# Patient Record
Sex: FEMALE | Race: WHITE | NOT HISPANIC OR LATINO | URBAN - METROPOLITAN AREA
[De-identification: names, ages, dates, MRNs, and addresses within clinical notes are randomized per-mention and may not be internally consistent; named-entity substitution may affect disease eponyms.]

---

## 2017-02-02 ENCOUNTER — OUTPATIENT (OUTPATIENT)
Dept: OUTPATIENT SERVICES | Facility: HOSPITAL | Age: 32
LOS: 1 days | Discharge: HOME | End: 2017-02-02

## 2017-06-27 DIAGNOSIS — R53.83 OTHER FATIGUE: ICD-10-CM

## 2017-06-27 DIAGNOSIS — F41.3 OTHER MIXED ANXIETY DISORDERS: ICD-10-CM

## 2017-09-19 ENCOUNTER — OUTPATIENT (OUTPATIENT)
Dept: OUTPATIENT SERVICES | Facility: HOSPITAL | Age: 32
LOS: 1 days | Discharge: HOME | End: 2017-09-19

## 2017-09-19 DIAGNOSIS — E28.2 POLYCYSTIC OVARIAN SYNDROME: ICD-10-CM

## 2017-09-19 DIAGNOSIS — G03.9 MENINGITIS, UNSPECIFIED: ICD-10-CM

## 2018-01-30 ENCOUNTER — OUTPATIENT (OUTPATIENT)
Dept: OUTPATIENT SERVICES | Facility: HOSPITAL | Age: 33
LOS: 1 days | Discharge: HOME | End: 2018-01-30

## 2018-01-30 DIAGNOSIS — R10.2 PELVIC AND PERINEAL PAIN: ICD-10-CM

## 2018-02-04 DIAGNOSIS — G03.9 MENINGITIS, UNSPECIFIED: ICD-10-CM

## 2018-05-31 ENCOUNTER — OUTPATIENT (OUTPATIENT)
Dept: OUTPATIENT SERVICES | Facility: HOSPITAL | Age: 33
LOS: 1 days | Discharge: HOME | End: 2018-05-31

## 2018-05-31 DIAGNOSIS — E06.3 AUTOIMMUNE THYROIDITIS: ICD-10-CM

## 2018-05-31 DIAGNOSIS — E28.2 POLYCYSTIC OVARIAN SYNDROME: ICD-10-CM

## 2018-05-31 DIAGNOSIS — D63.8 ANEMIA IN OTHER CHRONIC DISEASES CLASSIFIED ELSEWHERE: ICD-10-CM

## 2018-08-16 ENCOUNTER — OUTPATIENT (OUTPATIENT)
Dept: OUTPATIENT SERVICES | Facility: HOSPITAL | Age: 33
LOS: 1 days | Discharge: HOME | End: 2018-08-16

## 2018-08-16 DIAGNOSIS — R74.0 NONSPECIFIC ELEVATION OF LEVELS OF TRANSAMINASE AND LACTIC ACID DEHYDROGENASE [LDH]: ICD-10-CM

## 2018-08-16 DIAGNOSIS — E06.3 AUTOIMMUNE THYROIDITIS: ICD-10-CM

## 2018-08-16 DIAGNOSIS — Z01.419 ENCOUNTER FOR GYNECOLOGICAL EXAMINATION (GENERAL) (ROUTINE) WITHOUT ABNORMAL FINDINGS: ICD-10-CM

## 2019-01-03 ENCOUNTER — OUTPATIENT (OUTPATIENT)
Dept: OUTPATIENT SERVICES | Facility: HOSPITAL | Age: 34
LOS: 1 days | Discharge: HOME | End: 2019-01-03

## 2019-01-03 DIAGNOSIS — R51 HEADACHE: ICD-10-CM

## 2019-01-03 DIAGNOSIS — M54.2 CERVICALGIA: ICD-10-CM

## 2019-02-26 ENCOUNTER — OUTPATIENT (OUTPATIENT)
Dept: OUTPATIENT SERVICES | Facility: HOSPITAL | Age: 34
LOS: 1 days | Discharge: HOME | End: 2019-02-26

## 2019-02-26 DIAGNOSIS — R00.2 PALPITATIONS: ICD-10-CM

## 2019-02-26 DIAGNOSIS — Q79.6 EHLERS-DANLOS SYNDROMES: ICD-10-CM

## 2019-02-26 DIAGNOSIS — I95.1 ORTHOSTATIC HYPOTENSION: ICD-10-CM

## 2019-04-17 ENCOUNTER — TRANSCRIPTION ENCOUNTER (OUTPATIENT)
Age: 34
End: 2019-04-17

## 2019-04-28 ENCOUNTER — EMERGENCY (EMERGENCY)
Facility: HOSPITAL | Age: 34
LOS: 0 days | Discharge: HOME | End: 2019-04-28
Attending: EMERGENCY MEDICINE | Admitting: EMERGENCY MEDICINE
Payer: MEDICAID

## 2019-04-28 VITALS
WEIGHT: 214.95 LBS | SYSTOLIC BLOOD PRESSURE: 130 MMHG | OXYGEN SATURATION: 97 % | TEMPERATURE: 98 F | DIASTOLIC BLOOD PRESSURE: 71 MMHG | HEIGHT: 67 IN | HEART RATE: 98 BPM | RESPIRATION RATE: 20 BRPM

## 2019-04-28 VITALS
OXYGEN SATURATION: 99 % | DIASTOLIC BLOOD PRESSURE: 74 MMHG | SYSTOLIC BLOOD PRESSURE: 116 MMHG | RESPIRATION RATE: 18 BRPM | HEART RATE: 80 BPM

## 2019-04-28 DIAGNOSIS — T40.7X5A ADVERSE EFFECT OF CANNABIS (DERIVATIVES), INITIAL ENCOUNTER: ICD-10-CM

## 2019-04-28 DIAGNOSIS — Z79.899 OTHER LONG TERM (CURRENT) DRUG THERAPY: ICD-10-CM

## 2019-04-28 DIAGNOSIS — R20.0 ANESTHESIA OF SKIN: ICD-10-CM

## 2019-04-28 DIAGNOSIS — F41.9 ANXIETY DISORDER, UNSPECIFIED: ICD-10-CM

## 2019-04-28 DIAGNOSIS — R00.2 PALPITATIONS: ICD-10-CM

## 2019-04-28 PROCEDURE — 99284 EMERGENCY DEPT VISIT MOD MDM: CPT

## 2019-04-28 PROCEDURE — 71046 X-RAY EXAM CHEST 2 VIEWS: CPT | Mod: 26

## 2019-04-28 NOTE — ED ADULT NURSE NOTE - OBJECTIVE STATEMENT
pt states she accidentally ate some candy that contained marijuana. She c/o  muscle twitching, increased anxiety and shakiness

## 2019-04-28 NOTE — ED PROVIDER NOTE - CARE PROVIDER_API CALL
Rey Wiley)  Cardiovascular Disease; Internal Medicine  [address] [place] [id number]  Phone: [phone]  Fax: [phone]  Follow Up Time: 1-3 Days

## 2019-04-28 NOTE — ED PROVIDER NOTE - OBJECTIVE STATEMENT
34 y./o female w/ hx of anxiety presents to the ED for evaluation of anxiety.  States that she was feeling anxious this evening and ate marijuana edibles.  About 1.5 hrs later developed palpitations which concerned her prompting visit to the ED.  Feels improved at this time.  No further complaints.  Chinedu chest pain, dyspnea, abd pain, edema of lower extremities, calf pain, hemoptysis, cough, N/V/D.  Denies SI/HI.

## 2019-04-28 NOTE — ED PROVIDER NOTE - PHYSICAL EXAMINATION
CONST: Well appearing in NAD  EYES: Sclera and conjunctiva clear.  CARD: Normal S1 S2; Normal rate and rhythm  RESP: Equal BS B/L, No wheezes, rhonchi or rales. No distress  GI: Soft, non-tender, non-distended.  MS: Normal ROM in all extremities. No edema of lower extremities, no calf pain, radial pulses 2+ bilaterally  SKIN: Warm, dry, no acute rashes. Good turgor  NEURO: A&Ox3, No focal deficits. Strength 5/5 with no sensory deficits. Steady gait  PSYCH: denies SI/HI, cooperative.

## 2019-04-28 NOTE — ED PROVIDER NOTE - NSFOLLOWUPINSTRUCTIONS_ED_ALL_ED_FT
Anxiety    Generalized anxiety disorder (RENATO) is a mental disorder. It is defined as anxiety that is not necessarily related to specific events or activities or is out of proportion to said events. Symptoms include restlessness, fatigue, difficulty concentrations, irritability and difficulty concentrating. It may interfere with life functions, including relationships, work, and school. If you were started on a medication, make sure to take exactly as prescribed and follow up with a psychiatrist.    SEEK IMMEDIATE MEDICAL CARE IF YOU HAVE ANY OF THE FOLLOWING SYMPTOMS: thoughts about hurting killing yourself, thoughts about hurting or killing somebody else, hallucinations, or worsening depression.,    Palpitations    A palpitation is the feeling that your heartbeat is irregular or is faster than normal. It may feel like your heart is fluttering or skipping a beat. They may be caused by many things, including smoking, caffeine, alcohol, stress, and certain medicines. Although most causes of palpitations are not serious, palpitations can be a sign of a serious medical problem. Avoid caffeine, alcohol, and tobacco products at home. Try to reduce stress and anxiety and make sure to get plenty of rest.     SEEK IMMEDIATE MEDICAL CARE IF YOU HAVE ANY OF THE FOLLOWING SYMPTOMS: chest pain, shortness of breath, severe headache, dizziness/lightheadedness, or fainting.    Follow up with your primary medical doctor in 1-2 days

## 2019-04-28 NOTE — ED ADULT NURSE NOTE - CHIEF COMPLAINT QUOTE
Pt states she accidentally ingested some candy containing marijuana and is now shaking and twitching.  She is concerned due to her usual medications.

## 2019-04-28 NOTE — ED PROVIDER NOTE - ATTENDING CONTRIBUTION TO CARE
I personally evaluated the patient. I reviewed the Resident’s or Physician Assistant’s note (as assigned above), and agree with the findings and plan except as documented in my note.  Chart reviewed. H/O anxiety, took 2 tabs of marijuana pills, presents with palitations, anxiety and numbness to both hands. Exam unremarkable. Will order FS, EKG and CXR.

## 2019-04-28 NOTE — ED PROVIDER NOTE - NS ED ROS FT
Constitutional: See HPI.  Eyes: No visual changes, eye pain or discharge.   ENMT: No hearing changes, pain, discharge or infections.   Cardiac: + palpitations. No SOB or edema. No chest pain with exertion.  Respiratory: No cough or respiratory distress.   GI: No nausea, vomiting, diarrhea or abdominal pain.  : No dysuria, frequency or burning. No Discharge  MS: No myalgia, muscle weakness, joint pain or back pain.  Neuro: No headache or weakness.   Skin: No skin rash.  Except as documented in the HPI, all other systems are negative.

## 2019-05-13 ENCOUNTER — APPOINTMENT (OUTPATIENT)
Dept: NEUROLOGY | Facility: CLINIC | Age: 34
End: 2019-05-13

## 2019-05-31 ENCOUNTER — APPOINTMENT (OUTPATIENT)
Dept: OTOLARYNGOLOGY | Facility: CLINIC | Age: 34
End: 2019-05-31
Payer: MEDICAID

## 2019-05-31 VITALS
WEIGHT: 210 LBS | SYSTOLIC BLOOD PRESSURE: 101 MMHG | BODY MASS INDEX: 32.96 KG/M2 | DIASTOLIC BLOOD PRESSURE: 70 MMHG | HEIGHT: 67 IN

## 2019-05-31 DIAGNOSIS — H91.93 UNSPECIFIED HEARING LOSS, BILATERAL: ICD-10-CM

## 2019-05-31 PROCEDURE — 92550 TYMPANOMETRY & REFLEX THRESH: CPT

## 2019-05-31 PROCEDURE — 99204 OFFICE O/P NEW MOD 45 MIN: CPT | Mod: 25

## 2019-05-31 PROCEDURE — 92557 COMPREHENSIVE HEARING TEST: CPT

## 2019-05-31 NOTE — HISTORY OF PRESENT ILLNESS
[de-identified] : Patient here today c/o possible bilateral hearing loss. Patient admits it has been going on for about 6 months. \par Patient feels that she asks people to repeat themselves a lot. \par Patient admits tinnitus in b/l ears. Shooting pain from time to time. No otorrhea. \par \par SHe says her symptoms are getting better slowly, but still not normal.\par

## 2019-05-31 NOTE — PHYSICAL EXAM
[de-identified] : pain upon palpation of left TMJ.  [Midline] : trachea located in midline position [Normal] : no rashes

## 2019-08-06 ENCOUNTER — APPOINTMENT (OUTPATIENT)
Dept: NEUROLOGY | Facility: CLINIC | Age: 34
End: 2019-08-06
Payer: MEDICAID

## 2019-08-06 VITALS
SYSTOLIC BLOOD PRESSURE: 122 MMHG | DIASTOLIC BLOOD PRESSURE: 81 MMHG | HEART RATE: 75 BPM | HEIGHT: 67 IN | WEIGHT: 210 LBS | BODY MASS INDEX: 32.96 KG/M2 | OXYGEN SATURATION: 97 %

## 2019-08-06 PROBLEM — F41.9 ANXIETY DISORDER, UNSPECIFIED: Chronic | Status: ACTIVE | Noted: 2019-04-28

## 2019-08-06 PROCEDURE — 99214 OFFICE O/P EST MOD 30 MIN: CPT

## 2019-08-06 NOTE — PHYSICAL EXAM
[FreeTextEntry1] : Orientation: oriented to person, oriented to place and oriented to time. \par Attention: normal concentrating ability and visual attention was not decreased. \par Language: no difficulty naming common objects, no difficulty repeating a phrase, no difficulty writing a sentence, fluency intact, comprehension intact and reading intact. \par Fund of knowledge: displays adequate knowledge of personal past history. \par Cranial Nerves: visual fields full to confrontation, pupils equal round and reactive to light, extraocular motion intact, facial sensation intact symmetrically, face symmetrical, hearing was intact bilaterally, tongue and palate midline, head turning and shoulder shrug symmetric and there was no tongue deviation with protrusion. \par Motor: muscle tone was normal in all four extremities, muscle strength was normal in all four extremities and normal bulk in all four extremities. \par Sensory exam: light touch, PP, Vibration was intact. \par Coordination:. normal gait. balance was intact. there was no past-pointing. no tremor present. \par Deep tendon reflexes: \par Biceps right 2+. Biceps left 2+.  \par Triceps right 2+. Triceps left 2+.  LOC\par Brachioradialis right 2+. Brachioradialis left 2+.  \par Patella right 2+. Patella left 2+.  \par Ankle jerk right 2+. Ankle jerk left 2+. \par Plantar responses normal on the right, normal on the left.  \par

## 2019-08-06 NOTE — DISCUSSION/SUMMARY
[FreeTextEntry1] : Ms. Deshpande is a 33yo woman with cervical spinal stenosis with spasms in different muscle.  No longer having neck pain and still gets headaches but significantly less.\par 1. Physical therapy\par 2. f/u in 6 months

## 2019-10-22 ENCOUNTER — OUTPATIENT (OUTPATIENT)
Dept: OUTPATIENT SERVICES | Facility: HOSPITAL | Age: 34
LOS: 1 days | Discharge: HOME | End: 2019-10-22

## 2019-10-22 DIAGNOSIS — Z00.00 ENCOUNTER FOR GENERAL ADULT MEDICAL EXAMINATION WITHOUT ABNORMAL FINDINGS: ICD-10-CM

## 2019-10-22 DIAGNOSIS — E03.9 HYPOTHYROIDISM, UNSPECIFIED: ICD-10-CM

## 2019-10-22 DIAGNOSIS — N39.0 URINARY TRACT INFECTION, SITE NOT SPECIFIED: ICD-10-CM

## 2019-10-22 DIAGNOSIS — D53.9 NUTRITIONAL ANEMIA, UNSPECIFIED: ICD-10-CM

## 2019-10-22 DIAGNOSIS — E55.9 VITAMIN D DEFICIENCY, UNSPECIFIED: ICD-10-CM

## 2019-10-22 DIAGNOSIS — E78.5 HYPERLIPIDEMIA, UNSPECIFIED: ICD-10-CM

## 2019-10-22 DIAGNOSIS — D51.8 OTHER VITAMIN B12 DEFICIENCY ANEMIAS: ICD-10-CM

## 2019-10-22 DIAGNOSIS — E11.9 TYPE 2 DIABETES MELLITUS WITHOUT COMPLICATIONS: ICD-10-CM

## 2020-02-05 ENCOUNTER — APPOINTMENT (OUTPATIENT)
Dept: NEUROLOGY | Facility: CLINIC | Age: 35
End: 2020-02-05
Payer: MEDICAID

## 2020-02-05 VITALS
WEIGHT: 210 LBS | SYSTOLIC BLOOD PRESSURE: 109 MMHG | BODY MASS INDEX: 32.96 KG/M2 | HEART RATE: 70 BPM | HEIGHT: 67 IN | DIASTOLIC BLOOD PRESSURE: 76 MMHG

## 2020-02-05 DIAGNOSIS — M25.519 PAIN IN UNSPECIFIED SHOULDER: ICD-10-CM

## 2020-02-05 PROCEDURE — 99214 OFFICE O/P EST MOD 30 MIN: CPT

## 2020-02-05 NOTE — REVIEW OF SYSTEMS
[As Noted in HPI] : as noted in HPI [Arthralgias] : arthralgias [Joint Pain] : joint pain [Negative] : ENT

## 2020-02-05 NOTE — PHYSICAL EXAM
[FreeTextEntry1] : Orientation: oriented to person, oriented to place and oriented to time. \par Attention: normal concentrating ability and visual attention was not decreased. \par Language: no difficulty naming common objects, no difficulty repeating a phrase, no difficulty writing a sentence, fluency intact, comprehension intact and reading intact. \par Fund of knowledge: displays adequate knowledge of personal past history. \par Cranial Nerves: visual fields full to confrontation, pupils equal round and reactive to light, extraocular motion intact, facial sensation intact symmetrically, face symmetrical, hearing was intact bilaterally, tongue and palate midline, head turning and shoulder shrug symmetric and there was no tongue deviation with protrusion. \par Motor: muscle tone was normal in all four extremities, muscle strength was normal in all four extremities and normal bulk in all four extremities. \par Sensory exam: light touch, PP, Vibration was intact. \par Coordination:. normal gait. balance was intact. there was no past-pointing. no tremor present. \par Deep tendon reflexes: \par Biceps right 2+. Biceps left 2+. \par Triceps right 2+. Triceps left 2+. LOC\par Brachioradialis right 2+. Brachioradialis left 2+. \par Patella right 2+. Patella left 2+. \par Ankle jerk right 2+. Ankle jerk left 2+. \par Plantar responses normal on the right, normal on the left.\par \par Of note there is pain in subscapular region on right and paraspinal muscle on the right going further down he thoracic spine region

## 2020-02-05 NOTE — DISCUSSION/SUMMARY
[FreeTextEntry1] : Ms. Deshpande is a 35yo woman with migraines, cervical spinal stenosis and new throacic and subscapular pain.\par 1. MRI thoracic spine and xray throacic spine\par 2. PT for neck\par 3. Follow with orthopedics\par 4. Neurosurgery referral for cervical spinal stenosis\par 5. Robaxin 750mg PRN\par 6. Aimovig for migraines which are 12-14 per month and failed topamax and nortriptiline in past and was using imitrex in past\par 7. f/u in 3 months

## 2020-02-05 NOTE — HISTORY OF PRESENT ILLNESS
[FreeTextEntry1] : Ms. Deshpande is a 34-year-old woman last seen by me on 8/6/2019 for her headaches and migraines. She was sent for an mri cervical spine which showed moderate to severe spinal stenosis at c5/c6. She is getting neck pain still but now has pain in her right subscapular region which has been severe for the last few weeks.  She doesn’t remember doing anything that could have caused this and has an appt with orthopedics later this week as well.  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

## 2020-02-25 ENCOUNTER — APPOINTMENT (OUTPATIENT)
Dept: NEUROSURGERY | Facility: CLINIC | Age: 35
End: 2020-02-25
Payer: MEDICAID

## 2020-02-25 VITALS — WEIGHT: 210 LBS | BODY MASS INDEX: 32.96 KG/M2 | HEIGHT: 67 IN

## 2020-02-25 DIAGNOSIS — Z82.61 FAMILY HISTORY OF ARTHRITIS: ICD-10-CM

## 2020-02-25 DIAGNOSIS — Z82.0 FAMILY HISTORY OF EPILEPSY AND OTHER DISEASES OF THE NERVOUS SYSTEM: ICD-10-CM

## 2020-02-25 DIAGNOSIS — Z82.62 FAMILY HISTORY OF OSTEOPOROSIS: ICD-10-CM

## 2020-02-25 DIAGNOSIS — Z82.49 FAMILY HISTORY OF ISCHEMIC HEART DISEASE AND OTHER DISEASES OF THE CIRCULATORY SYSTEM: ICD-10-CM

## 2020-02-25 PROCEDURE — 99203 OFFICE O/P NEW LOW 30 MIN: CPT

## 2020-02-25 NOTE — REASON FOR VISIT
[Referred By: _________] : Patient was referred by GUERLINE [New Patient Visit] : a new patient visit

## 2020-02-26 NOTE — HISTORY OF PRESENT ILLNESS
[de-identified] : This is a 34 yrs old female who presents today for a consultation of neck stiffness and at times right arm pain. Patient reports she was experiencing right subscapular pain a few weeks ago which is now resolve after taking robaxin. She went to the orthopedic surgeon to be evaluated for this particular pain and found to have a right shoulder labral tear, though her subscapular pain she was told was coming from her neck. At times she experiences pins and needles in her hands and feet, and muscle spasms in her extremities, causing her to have jerking movement. Denies neck pain, gait disturbances, imbalance, bowel and/or urinary incontinence. Dr. Hernandez ordered an MRI of the cervical spine and referred her to neurosurgery. \par \par MRI of the cervical spine without contrast done on 1/30/2019 showed C5-6 disc herniation with moderate to severe spinal stenosis [FreeTextEntry1] : neck stiffness

## 2020-02-26 NOTE — PLAN
[FreeTextEntry1] : At this time, surgery is not indicated.  She has not completed conservative management.  Should her symptoms persist, I recommend a new MRI cervical spine.  If there are no changes, she may be a candidate for a C5-6 TDR.

## 2020-03-04 ENCOUNTER — APPOINTMENT (OUTPATIENT)
Dept: CARDIOLOGY | Facility: CLINIC | Age: 35
End: 2020-03-04

## 2020-03-30 ENCOUNTER — APPOINTMENT (OUTPATIENT)
Dept: CARDIOLOGY | Facility: CLINIC | Age: 35
End: 2020-03-30

## 2020-04-06 ENCOUNTER — APPOINTMENT (OUTPATIENT)
Dept: CARDIOLOGY | Facility: CLINIC | Age: 35
End: 2020-04-06

## 2020-04-13 ENCOUNTER — APPOINTMENT (OUTPATIENT)
Dept: CARDIOLOGY | Facility: CLINIC | Age: 35
End: 2020-04-13

## 2020-05-11 ENCOUNTER — APPOINTMENT (OUTPATIENT)
Dept: NEUROLOGY | Facility: CLINIC | Age: 35
End: 2020-05-11
Payer: MEDICAID

## 2020-05-11 PROCEDURE — 99214 OFFICE O/P EST MOD 30 MIN: CPT | Mod: 95

## 2020-05-11 NOTE — PHYSICAL EXAM
[FreeTextEntry1] : a+ox3 language and attention normal\par No facial EOMI\par No drift \par ANABEL symemtric\par

## 2020-05-11 NOTE — DISCUSSION/SUMMARY
[FreeTextEntry1] : Ms. Deshpande is a 36yo woman with migraines, cervical radiculopathy and now new lumbar radiculopathy\par 1. MRI cervical and lumbar spine w/o RENATO\par 2. Increase Aimovig to 140\par 3. Indomethacin 50mg aty onset of lacrimation before migraines\par 4. Riboflavin 100mg BID\par 5. PT for the neck\par 6. f/u in 3-4 weeks post imaging

## 2020-05-11 NOTE — HISTORY OF PRESENT ILLNESS
[FreeTextEntry1] : Ms. Deshpande is a 34yo woman last seen by me on 2/5/2020 for f/u of her migraines and cervical radiculopathy.  since the last visit she saw Dr. Jones of neurosx and was not a candidate for sx at that time.  While working a new job she injured her lower back and after taking 800mg of motrin and 1000mg of robaxin for about a week it improved.  She still gets pain in the back when she bends the wrong way.  She has also been getting more migraines and the aimovig 70mg has not done muchto reduce the migrain frequency

## 2020-05-28 ENCOUNTER — OUTPATIENT (OUTPATIENT)
Dept: OUTPATIENT SERVICES | Facility: HOSPITAL | Age: 35
LOS: 1 days | Discharge: HOME | End: 2020-05-28
Payer: MEDICAID

## 2020-05-28 DIAGNOSIS — M54.5 LOW BACK PAIN: ICD-10-CM

## 2020-05-28 DIAGNOSIS — M54.16 RADICULOPATHY, LUMBAR REGION: ICD-10-CM

## 2020-05-28 PROCEDURE — 72110 X-RAY EXAM L-2 SPINE 4/>VWS: CPT | Mod: 26

## 2020-07-22 ENCOUNTER — APPOINTMENT (OUTPATIENT)
Dept: NEUROLOGY | Facility: CLINIC | Age: 35
End: 2020-07-22
Payer: MEDICAID

## 2020-07-22 VITALS
SYSTOLIC BLOOD PRESSURE: 104 MMHG | BODY MASS INDEX: 34.53 KG/M2 | HEART RATE: 94 BPM | WEIGHT: 220 LBS | TEMPERATURE: 97.3 F | OXYGEN SATURATION: 98 % | HEIGHT: 67 IN | DIASTOLIC BLOOD PRESSURE: 64 MMHG

## 2020-07-22 DIAGNOSIS — G47.00 INSOMNIA, UNSPECIFIED: ICD-10-CM

## 2020-07-22 PROCEDURE — 99214 OFFICE O/P EST MOD 30 MIN: CPT

## 2020-07-22 NOTE — HISTORY OF PRESENT ILLNESS
[FreeTextEntry1] : Ms. Deshpande is a 34yo woman last seen by me on 5/11/2020 for follow up of her migraines and cervical and lumbar radiculopathy.  At last visit she was complaining of both her neck and back bothering her more and the lower back was the most severe.  I sent her for an xray of the LS spine which showed mild degenerative changes and she was started on NSAID and muscle relaxers.  She did get some improvement but still deals with daily pain in the back radiating down the right buttocks and leg.  Intermittently her right leg will give out.  She continues to get neck pain and spasms and her last MRI showed moderate to severe spinal canal stenosis at c5/c6 and she was being watched for worsening.  Since then she has had a few falls which have aggravated her neck but also has been noticing that she is becoming off balance frequently.

## 2020-07-22 NOTE — REVIEW OF SYSTEMS
[As Noted in HPI] : as noted in HPI [Arthralgias] : arthralgias [Joint Pain] : joint pain [Negative] : Eyes

## 2020-07-22 NOTE — DISCUSSION/SUMMARY
[FreeTextEntry1] : Ms. Deshpande is a 36yo woman with worsening neck and lower back pain with progression of symptoms clinically although she has been on NSAIDs and muscle relaxers with physical therapy.  Given the severity of her cervical spine with a number of falls recently would repeat the MRI cervical spine w/o RENATO to assess for progression of spinal stenosis and encroachment on the spinal cord.  aS for the lower back she has a clear L4/L5 demratomal sensory dysfunction and some weakness not present on prior visits most likely given her subjective description a new lumbar radiculopathy on the right predominantly.\par 1. MRI cervical and lumbar spine w/o RENATO\par 2. Continue Robaxin and naproxen\par 3. Continue PT\par 4. pain management referral\par 5. f/u in 3-4 months

## 2020-07-22 NOTE — PHYSICAL EXAM
[FreeTextEntry1] : a+Ox3 language and attention normal\par CN 2-12 normal\par power in UE 5/5 except left finger extension on right 5-/5\par power in LE 5/5 except right HF 4+/5 and right KF 5-/5\par Sensory decreased to PP, Temp in right L4/L5 demratomes\par DTR 2+ LUE and 1+ right bicep\par absent ankles and 1+ patellar reflexes\par FTN NL\par Rhomberg absent\par Gait antlagic \par

## 2020-07-30 ENCOUNTER — OUTPATIENT (OUTPATIENT)
Dept: OUTPATIENT SERVICES | Facility: HOSPITAL | Age: 35
LOS: 1 days | Discharge: HOME | End: 2020-07-30
Payer: MEDICAID

## 2020-07-30 DIAGNOSIS — M48.9 SPONDYLOPATHY, UNSPECIFIED: ICD-10-CM

## 2020-07-30 DIAGNOSIS — M54.16 RADICULOPATHY, LUMBAR REGION: ICD-10-CM

## 2020-07-30 PROCEDURE — 72148 MRI LUMBAR SPINE W/O DYE: CPT | Mod: 26

## 2020-07-30 PROCEDURE — 72052 X-RAY EXAM NECK SPINE 6/>VWS: CPT | Mod: 26

## 2020-07-30 PROCEDURE — 72141 MRI NECK SPINE W/O DYE: CPT | Mod: 26

## 2020-09-02 ENCOUNTER — RESULT REVIEW (OUTPATIENT)
Age: 35
End: 2020-09-02

## 2020-10-06 NOTE — ED ADULT TRIAGE NOTE - CHIEF COMPLAINT QUOTE
Pt states she accidentally ingested some candy containing marijuana and is now shaking and twitching.  She is concerned due to her usual medications. yes

## 2020-11-17 ENCOUNTER — RX RENEWAL (OUTPATIENT)
Age: 35
End: 2020-11-17

## 2020-11-23 ENCOUNTER — APPOINTMENT (OUTPATIENT)
Dept: NEUROLOGY | Facility: CLINIC | Age: 35
End: 2020-11-23
Payer: COMMERCIAL

## 2020-11-23 PROCEDURE — 99214 OFFICE O/P EST MOD 30 MIN: CPT | Mod: 95

## 2020-11-30 NOTE — PHYSICAL EXAM
[FreeTextEntry1] : gait is antalgic with a limp\par decreased sensation in the left l4 dermatome to LT\par

## 2020-11-30 NOTE — REVIEW OF SYSTEMS
[Feeling Poorly] : feeling poorly [As Noted in HPI] : as noted in HPI [Depression] : depression [Arthralgias] : arthralgias [Joint Pain] : joint pain [Joint Stiffness] : joint stiffness [Limb Pain] : limb pain [Negative] : ENT

## 2020-11-30 NOTE — HISTORY OF PRESENT ILLNESS
[FreeTextEntry1] : Ms. Deshpande is a 36yo woman being followed for her cervical and lumbar spine disease.  Her lower back pain was improving and was radiating down her right leg in the past but much better over last 2-3 months. She then fell recently and felt that she popped something in her back and since this time 2-3 weeks ago she has been having severe pain in her lower back radiating down the left leg.  This is different from her prior pains radiating down her right leg.  This is hindered her walking and PT appears to exacerbate her back pain.  NO change in her bowel or bladder habits\par

## 2020-11-30 NOTE — DISCUSSION/SUMMARY
[FreeTextEntry1] : Ms. Deshpande is a 34yo woman with migraines, cervical and lumbar radiculopathy with recent fall with new constant left radicular pain.\par 1. Medrol dose tiago\par 2. MRI lumbar spine w/o RENATO\par 3. PT for lumbar spine\par

## 2020-12-19 ENCOUNTER — OUTPATIENT (OUTPATIENT)
Dept: OUTPATIENT SERVICES | Facility: HOSPITAL | Age: 35
LOS: 1 days | Discharge: HOME | End: 2020-12-19
Payer: COMMERCIAL

## 2020-12-19 DIAGNOSIS — M54.16 RADICULOPATHY, LUMBAR REGION: ICD-10-CM

## 2020-12-19 PROCEDURE — 72110 X-RAY EXAM L-2 SPINE 4/>VWS: CPT | Mod: 26

## 2021-01-06 ENCOUNTER — APPOINTMENT (OUTPATIENT)
Dept: NEUROLOGY | Facility: CLINIC | Age: 36
End: 2021-01-06
Payer: COMMERCIAL

## 2021-01-06 PROCEDURE — 99213 OFFICE O/P EST LOW 20 MIN: CPT | Mod: 95

## 2021-01-06 NOTE — REASON FOR VISIT
[Home] : at home, [unfilled] , at the time of the visit. [Medical Office: ([facility])___] : at the medical office located in  [Verbal consent obtained from patient] : the patient, [unfilled]

## 2021-01-07 NOTE — DISCUSSION/SUMMARY
[FreeTextEntry1] : Ms. Deshpande is a 34yo woman being seen for follow up of her lower back pain.  She has been using OTC anti-inflammatory medications with some slight imrpovement in back pain but still notices significant pain mostly on the right lower back radiating down her leg.  She has been trying exercises she did with PT in the past but does not feel any improvement.  At this point will send her back for PT again to see if she can make any further improvement.\par 1. PT lumbar spine\par 2. Neurosurgery referral Dr. Jones - f/u chronic lumbar radiculopathy\par 3. Adderall and Diazepam given as refill but recommended finding a doctor to maintain these\par 4. Ibuprofen or alleve PRN pain\par 5. f/u in 2 months

## 2021-01-07 NOTE — PHYSICAL EXAM
[FreeTextEntry1] : limited exam\par A+Ox3\par No facial EOMI\par Movements symmetric\par Slight decrease in foot tap speed on right\par gait antalgic and stooped when getting up

## 2021-01-07 NOTE — HISTORY OF PRESENT ILLNESS
[FreeTextEntry1] : Ms. Deshpande is a 34yo woman being followed for her neck and lower back issues.  Since her fall in November she has been noticing worsening back pain and recently it has gotten slightly better with OTC NSAIDs and home exercise.  She still has pain and has difficulty leaning forward and carrying things.  She sometimes wakes in morning with pain in lower back.  While she has made some improvement

## 2021-02-06 ENCOUNTER — EMERGENCY (EMERGENCY)
Facility: HOSPITAL | Age: 36
LOS: 0 days | Discharge: HOME | End: 2021-02-06
Attending: EMERGENCY MEDICINE | Admitting: EMERGENCY MEDICINE
Payer: COMMERCIAL

## 2021-02-06 VITALS
HEIGHT: 67 IN | TEMPERATURE: 98 F | HEART RATE: 78 BPM | OXYGEN SATURATION: 99 % | RESPIRATION RATE: 18 BRPM | WEIGHT: 210.1 LBS | SYSTOLIC BLOOD PRESSURE: 136 MMHG | DIASTOLIC BLOOD PRESSURE: 78 MMHG

## 2021-02-06 DIAGNOSIS — Z00.00 ENCOUNTER FOR GENERAL ADULT MEDICAL EXAMINATION WITHOUT ABNORMAL FINDINGS: ICD-10-CM

## 2021-02-06 DIAGNOSIS — U07.1 COVID-19: ICD-10-CM

## 2021-02-06 DIAGNOSIS — R42 DIZZINESS AND GIDDINESS: ICD-10-CM

## 2021-02-06 PROCEDURE — 99284 EMERGENCY DEPT VISIT MOD MDM: CPT

## 2021-02-06 NOTE — ED ADULT NURSE NOTE - SUICIDE SCREENING QUESTION 3
pt eloped, states he wants his IVL removed and wants to leave, attempt to educate pt. IVL removed by PMD, pt refused to sign AMA papers.
No

## 2021-02-06 NOTE — ED PROVIDER NOTE - NSFOLLOWUPINSTRUCTIONS_ED_ALL_ED_FT
Novel Coronavirus (COVID-19)  The Facts  What is a coronavirus?  Coronaviruses are a large family of viruses that cause illnesses ranging from the common cold  to more severe diseases such as Middle East Respiratory Syndrome (MERS) and Severe Acute  Respiratory Syndrome (SARS).  What is Novel Coronavirus (COVID-19)?  COVID-19 is a new strain of Coronavirus that has not been previously identified in humans. COVID-19  was identified in [place], [place], [place] in December 2019 (COVID-19). COVID-19 has  since been identified outside of [place], in a growing number of countries internationally, including  the [place].  Where can I find the most recent information about COVID-19?  The Centers for Disease Control and Prevention (CDC) is closely monitoring the outbreak caused by the  COVID-19. For the latest information about COVID- 19, visit the CDC website at  https://www.cdc.gov/coronavirus/index.html  How are coronaviruses spread?  Coronaviruses can be transmitted from person-to- person, usually after close contact with an infected person,  for example, in a household, workplace, or healthcare setting via droplets that become airborne after a cough  or sneeze by an affected person. These droplets can then infect a nearby person. It is likely transmission also  occurs by touching recently contaminated surfaces.  What are the symptoms of coronavirus infection?  It depends on the virus, but common signs include fever and/or respiratory symptoms such as  cough and shortness of breath. In more severe cases, infection can cause pneumonia, severe acute  respiratory syndrome, kidney failure and even death. Fortunately, most cases of COVID-19 have an  illness no different than the influenza “flu”. With a majority of these patients having mild symptoms  and overall mortality which appears to be not much different than the flu.  Is there a treatment for a COVID-19?  There is no specific treatment for disease caused by COVID-19. However, many of the symptoms can  be treated based on the patient’s clinical condition. Supportive care for infected persons can be highly  effective.  What can I do to protect myself?  Washing your hands, covering your cough, and disinfecting surfaces are the best precautionary  measures. It is also advisable to avoid close contact with anyone showing symptoms of respiratory  illness such as coughing and sneezing. Those with symptoms should wear a surgical mask when  around others.  What can I do to protect those around me?  If you have been identified as someone who may be infected with COVID-19, we recommend you  follow the self-isolation procedures outlined below to protect those around you and limit the spread  of this virus.   March 3, 2020  Recommendations for Patients Advised to Self-Isolate  for Possible COVID-19 Exposure  We recommend the below precautionary steps from now until 14 days from when you  returned from your travel or date of your last known possible contact:  - Do not go to work, school, or public areas. Avoid using public transportation, ride-sharing, or  taxis.  - As much as possible, separate yourself from other people in your home. If you can, you should  stay in a room and away from other people in your home. Also, you should use a separate  bathroom, if available.  - Wear the supplied mask whenever you are around other people.  - If you have a non-urgent medical appointment, please reschedule for a later date. If the  appointment is urgent, please call the healthcare provider and tell them that you are on selfisolation for possible COVID-19. This will help the healthcare provider’s office take steps to keep  other people from getting infected or exposed. If you can reschedule routine appointments, do  so.  - Wash your hands often with soap and water for at least 15 to 20 seconds or clean your hands  with an alcohol-based hand  that contains 60 to 95% alcohol, covering all surfaces of  your hands and rubbing them together until they feel dry. Soap and water should be used  preferentially if hands are visibly dirty.  - Cover your mouth and nose with a tissue when you cough or sneeze. Throw used tissues in a  lined trash can; immediately wash your hands.  - Avoid touching your eyes, nose, and mouth with your hands.  - Avoid sharing personal household items. You should not share dishes, drinking glasses, cups,  eating utensils, towels, or bedding with other people or pets in your home. After using these  items, they should be washed thoroughly with soap and water.  - Clean and disinfect all “high-touch” surfaces every day. High touch surfaces include counters,  tabletops, doorknobs, light switches, remote controls, bathroom fixtures, toilets, phones,  keyboards, tablets, and bedside tables. Also, clean any surfaces that may have blood, stool, or  body fluids on them.       If you develop worsening symptoms:  - If you develop worsening symptoms, such as severe shortness of breath, please call [phone] option #[phone]. They will assist you in determining your next steps.  During your time on self-isolation do the following:  - Work from home if you are able to so.  - Limit social isolation by talking with friends and family on the phone or with face-time  - Talk with friends and relatives who don’t live with you about supporting each other if one  household has to be quarantined. For example, agree to drop groceries or other supplies at the  front door.  - Exercise and spend time outdoors away from others if able to do so.    Why didn’t I get tested for novel coronavirus (COVID-19)?  The number of available tests is very limited so strict rules exist for who is allowed to be tested.  [facility] has been authorized to perform testing and is currently working hard to be  able to start providing the test. Such testing is currently reserved for patients who have had  contact with someone infected with the virus, or those who are very sick a plus those who have  traveled to areas identified by the Centers for Disease Control and Prevention (CD) and will  require hospitalization.  What should I do now?  If you are well enough to be discharged home and are not in a high risk group to have  contracted the COVID-10, you should care for yourself at home exactly like you would if you  have Influenza “flu”. Follow all the standard guidelines about washing your hands, covering  your cough, etc.  You should return to the Emergency Department if you develop worse symptoms, trouble  breathing, chest pain, and/or a fever that doesn’t improve with over the counter  acetaminophen or ibuprofen.

## 2021-02-06 NOTE — ED PROVIDER NOTE - CLINICAL SUMMARY MEDICAL DECISION MAKING FREE TEXT BOX
Patient presented with lightheadedness with mild dyspnea, nausea and 1 episode of diarrhea s/p COVID dx. Otherwise afebrile, HD stable well appearing in ED, abd completely non-tender, lungs clear, 100% on RA. Obtained EKG which was non-ischemic. Patient ambulatory in ED without desaturation, tolerates PO. Given the above, will discharge home to quarantine. Patient agreeable with plan. Agrees to return to ED for any new or worsening symptoms.

## 2021-02-06 NOTE — ED PROVIDER NOTE - PATIENT PORTAL LINK FT
You can access the FollowMyHealth Patient Portal offered by [facility] by registering at the following website: [email or web address]. By joining [employer]’s FollowMyHealth portal, you will also be able to view your health information using other applications (apps) compatible with our system.

## 2021-02-06 NOTE — ED ADULT TRIAGE NOTE - CHIEF COMPLAINT QUOTE
pt BIBA from urgent care, testes positive with rapid test today, c/o diarrhea, nausea and near syncope.

## 2021-02-06 NOTE — ED PROVIDER NOTE - OBJECTIVE STATEMENT
34 yo female, pmh of hilda, presents to ed for lightheadedness, was at [facility], rapid + positive, went to [facility] for lightheadedness, one episode of diarrhea and nausea. denies fever, chills, cp, sob, syncope, le swelling, vomiting. no specific pain or radiation.

## 2021-02-06 NOTE — ED PROVIDER NOTE - NS ED ROS FT
Constitutional: (-) fever, (-) chills  Eyes: (-) visual changes  ENT: (-) nasal congestions  Cardiovascular: (-) chest pain, (-) syncope  Respiratory: (-) cough, (-) shortness of breath, (-) dyspnea,   Gastrointestinal: (-) vomiting, (+)diarrhea, (+)nausea,  Musculoskeletal: (-) neck pain, (-) back pain, (-) joint pain,  Integumentary: (-) rash, (-) edema, (-) bruises  Neurological: (-) headache, (-) loc, (-) dizziness, (-) tingling, (-)numbness, (+) lightheadedness   Peripheral Vascular: (-) leg swelling  :  (-)dysuria,  (-) hematuria  Allergic/Immunologic: (-) pruritus

## 2021-02-06 NOTE — ED PROVIDER NOTE - ATTENDING CONTRIBUTION TO CARE
35 year old female, pmhx as documented, presenting with lightheadedness s/p (+) COVID dx. States she also had 1 episode of diarrhea and nausea with mild dyspnea. Otherwise denies fevers, chest pain, palpitations, N/V or any other complaints.    Vital Signs: I have reviewed the initial vital signs.  Constitutional: NAD, well-nourished, appears stated age, no acute distress.  HEENT: Airway patent, moist MM, no erythema/swelling/deformity of oral structures. EOMI, PERRLA.  CV: regular rate, regular rhythm, well-perfused extremities, 2+ b/l DP and radial pulses equal.  Lungs: BCTA, no increased WOB.  ABD: NTND, no guarding or rebound, no pulsatile mass, no hernias.   MSK: Neck supple, nontender, nl ROM, no stepoff. Chest nontender. Back nontender in TLS spine or to b/l bony structures or flanks. Ext nontender, nl rom, no deformity.   INTEG: Skin warm, dry, no rash.  NEURO: A&Ox3, normal strength, nl sensation throughout, normal speech.   PSYCH: Calm, cooperative, normal affect and interaction.    Well appearing, normal O2 saturation. EKG reviewed and non-ischemic. Patient able to ambulate without dyspnea or desaturation, lungs clear, abd completely non-tender, tolerates PO. Given the above, will discharge. Patient agreeable with plan.

## 2021-02-09 ENCOUNTER — EMERGENCY (EMERGENCY)
Facility: HOSPITAL | Age: 36
LOS: 0 days | Discharge: HOME | End: 2021-02-09
Attending: STUDENT IN AN ORGANIZED HEALTH CARE EDUCATION/TRAINING PROGRAM | Admitting: STUDENT IN AN ORGANIZED HEALTH CARE EDUCATION/TRAINING PROGRAM
Payer: COMMERCIAL

## 2021-02-09 VITALS
SYSTOLIC BLOOD PRESSURE: 127 MMHG | WEIGHT: 210.1 LBS | TEMPERATURE: 98 F | OXYGEN SATURATION: 99 % | HEIGHT: 67 IN | DIASTOLIC BLOOD PRESSURE: 63 MMHG | RESPIRATION RATE: 22 BRPM | HEART RATE: 80 BPM

## 2021-02-09 DIAGNOSIS — R06.02 SHORTNESS OF BREATH: ICD-10-CM

## 2021-02-09 DIAGNOSIS — Z79.899 OTHER LONG TERM (CURRENT) DRUG THERAPY: ICD-10-CM

## 2021-02-09 DIAGNOSIS — U07.1 COVID-19: ICD-10-CM

## 2021-02-09 DIAGNOSIS — J45.909 UNSPECIFIED ASTHMA, UNCOMPLICATED: ICD-10-CM

## 2021-02-09 LAB
ALBUMIN SERPL ELPH-MCNC: 4 G/DL — SIGNIFICANT CHANGE UP (ref 3.5–5.2)
ALP SERPL-CCNC: 105 U/L — SIGNIFICANT CHANGE UP (ref 30–115)
ALT FLD-CCNC: 33 U/L — SIGNIFICANT CHANGE UP (ref 0–41)
ANION GAP SERPL CALC-SCNC: 9 MMOL/L — SIGNIFICANT CHANGE UP (ref 7–14)
APTT BLD: 32 SEC — SIGNIFICANT CHANGE UP (ref 27–39.2)
AST SERPL-CCNC: 23 U/L — SIGNIFICANT CHANGE UP (ref 0–41)
BASOPHILS # BLD AUTO: 0.03 K/UL — SIGNIFICANT CHANGE UP (ref 0–0.2)
BASOPHILS NFR BLD AUTO: 0.5 % — SIGNIFICANT CHANGE UP (ref 0–1)
BILIRUB SERPL-MCNC: 0.2 MG/DL — SIGNIFICANT CHANGE UP (ref 0.2–1.2)
BUN SERPL-MCNC: 12 MG/DL — SIGNIFICANT CHANGE UP (ref 10–20)
CALCIUM SERPL-MCNC: 9.1 MG/DL — SIGNIFICANT CHANGE UP (ref 8.5–10.1)
CHLORIDE SERPL-SCNC: 102 MMOL/L — SIGNIFICANT CHANGE UP (ref 98–110)
CO2 SERPL-SCNC: 24 MMOL/L — SIGNIFICANT CHANGE UP (ref 17–32)
CREAT SERPL-MCNC: 0.9 MG/DL — SIGNIFICANT CHANGE UP (ref 0.7–1.5)
D DIMER BLD IA.RAPID-MCNC: 153 NG/ML DDU — SIGNIFICANT CHANGE UP (ref 0–230)
EOSINOPHIL # BLD AUTO: 0.22 K/UL — SIGNIFICANT CHANGE UP (ref 0–0.7)
EOSINOPHIL NFR BLD AUTO: 3.8 % — SIGNIFICANT CHANGE UP (ref 0–8)
GLUCOSE SERPL-MCNC: 89 MG/DL — SIGNIFICANT CHANGE UP (ref 70–99)
HCG SERPL QL: NEGATIVE — SIGNIFICANT CHANGE UP
HCT VFR BLD CALC: 44.2 % — SIGNIFICANT CHANGE UP (ref 37–47)
HGB BLD-MCNC: 14 G/DL — SIGNIFICANT CHANGE UP (ref 12–16)
IMM GRANULOCYTES NFR BLD AUTO: 0.2 % — SIGNIFICANT CHANGE UP (ref 0.1–0.3)
INR BLD: 0.93 RATIO — SIGNIFICANT CHANGE UP (ref 0.65–1.3)
LYMPHOCYTES # BLD AUTO: 1.27 K/UL — SIGNIFICANT CHANGE UP (ref 1.2–3.4)
LYMPHOCYTES # BLD AUTO: 22.2 % — SIGNIFICANT CHANGE UP (ref 20.5–51.1)
MCHC RBC-ENTMCNC: 25.3 PG — LOW (ref 27–31)
MCHC RBC-ENTMCNC: 31.7 G/DL — LOW (ref 32–37)
MCV RBC AUTO: 79.8 FL — LOW (ref 81–99)
MONOCYTES # BLD AUTO: 0.43 K/UL — SIGNIFICANT CHANGE UP (ref 0.1–0.6)
MONOCYTES NFR BLD AUTO: 7.5 % — SIGNIFICANT CHANGE UP (ref 1.7–9.3)
NEUTROPHILS # BLD AUTO: 3.76 K/UL — SIGNIFICANT CHANGE UP (ref 1.4–6.5)
NEUTROPHILS NFR BLD AUTO: 65.8 % — SIGNIFICANT CHANGE UP (ref 42.2–75.2)
NRBC # BLD: 0 /100 WBCS — SIGNIFICANT CHANGE UP (ref 0–0)
NT-PROBNP SERPL-SCNC: 42 PG/ML — SIGNIFICANT CHANGE UP (ref 0–300)
PLATELET # BLD AUTO: 208 K/UL — SIGNIFICANT CHANGE UP (ref 130–400)
POTASSIUM SERPL-MCNC: 4.5 MMOL/L — SIGNIFICANT CHANGE UP (ref 3.5–5)
POTASSIUM SERPL-SCNC: 4.5 MMOL/L — SIGNIFICANT CHANGE UP (ref 3.5–5)
PROT SERPL-MCNC: 6.3 G/DL — SIGNIFICANT CHANGE UP (ref 6–8)
PROTHROM AB SERPL-ACNC: 10.7 SEC — SIGNIFICANT CHANGE UP (ref 9.95–12.87)
RBC # BLD: 5.54 M/UL — HIGH (ref 4.2–5.4)
RBC # FLD: 15 % — HIGH (ref 11.5–14.5)
SODIUM SERPL-SCNC: 135 MMOL/L — SIGNIFICANT CHANGE UP (ref 135–146)
TROPONIN T SERPL-MCNC: <0.01 NG/ML — SIGNIFICANT CHANGE UP
WBC # BLD: 5.72 K/UL — SIGNIFICANT CHANGE UP (ref 4.8–10.8)
WBC # FLD AUTO: 5.72 K/UL — SIGNIFICANT CHANGE UP (ref 4.8–10.8)

## 2021-02-09 PROCEDURE — 71045 X-RAY EXAM CHEST 1 VIEW: CPT | Mod: 26

## 2021-02-09 PROCEDURE — 99285 EMERGENCY DEPT VISIT HI MDM: CPT

## 2021-02-09 PROCEDURE — 93010 ELECTROCARDIOGRAM REPORT: CPT

## 2021-02-09 RX ORDER — DEXAMETHASONE 0.5 MG/5ML
6 ELIXIR ORAL ONCE
Refills: 0 | Status: COMPLETED | OUTPATIENT
Start: 2021-02-09 | End: 2021-02-09

## 2021-02-09 RX ORDER — SODIUM CHLORIDE 9 MG/ML
1000 INJECTION, SOLUTION INTRAVENOUS ONCE
Refills: 0 | Status: COMPLETED | OUTPATIENT
Start: 2021-02-09 | End: 2021-02-09

## 2021-02-09 RX ADMIN — SODIUM CHLORIDE 1000 MILLILITER(S): 9 INJECTION, SOLUTION INTRAVENOUS at 15:12

## 2021-02-09 RX ADMIN — Medication 6 MILLIGRAM(S): at 16:54

## 2021-02-09 NOTE — ED PROVIDER NOTE - ATTENDING CONTRIBUTION TO CARE
35 year old female, past medical history asthma who presents w/ progressive covid sx. pt staets she had an exposure last week and tested positive 3 days ago.  pt states her pulse ox had dropped at home so she came back for eval. pt states she has been taking azithromycin, asa and received steroids. pt states at night, she saturates ~90%.  pt also endorsing chest tightness which is mildly relieved by albuterol. pt also states she feels lightheaded despite attempting to maintain oral hydration    vss 98% at rest on room air  gen- NAD, aaox3  card-rrr  lungs-ctab, no wheezing or rhonchi  abd-sntnd, no guarding or rebound  neuro- full str/sensation, cn ii-xii grossly intact, normal coordination   LE- no calf pain/swelling    as pt is bounceback w/ worsening sx, will get screening labs, assess for PE w/ ddimer, cxr to assess covid pna burden, basic labs r/o lyte abn, less likely acs but will get trop/ekg  will provide supportive care, serial exam and ED observation period

## 2021-02-09 NOTE — ED PROVIDER NOTE - OBJECTIVE STATEMENT
35 year old female, past medical history asthma, who presents with covid-like symptoms. patient endorses generalized weakness, cough, n/v/d. Patient was COVID+ x3 days ago, was seen in ED 2/6 for symptoms. Patient reports persistent symptoms, noting episode of low o2, 89% ORA at home.

## 2021-02-09 NOTE — ED ADULT NURSE NOTE - INTERVENTIONS DEFINITIONS
[place] to call system/Call bell, personal items and telephone within reach/Non-slip footwear when patient is off stretcher/Stretcher in lowest position, wheels locked, appropriate side rails in place/Provide visual cue, wrist band, yellow gown, etc./Monitor gait and stability/Reinforce activity limits and safety measures with patient and family

## 2021-02-09 NOTE — ED PROVIDER NOTE - CLINICAL SUMMARY MEDICAL DECISION MAKING FREE TEXT BOX
pt presents for progressive covid sx. in ed, VSS, no hypoxemic episodes. pt treated conservatively. sx improving w/ decadron. trop neg. ddimer for PE screening negative. xr clear- no e/o bacterial pna. pt has pulse ox. will dc w/ conservative measure recommendations. hydration recommendations.

## 2021-02-09 NOTE — ED PROVIDER NOTE - NS ED ROS FT
Review of Systems:  	•	CONSTITUTIONAL: no fever, no diaphoresis, no chills  	•	SKIN: no rash  	•	ENT: no sore throat  	•	RESPIRATORY: +shortness of breath, +cough  	•	CARDIAC: no chest pain, no palpitations  	•	GI: +nausea, +vomiting, +diarrhea. no abd pain.  	•	GENITO-URINARY: no discharge, no dysuria; no hematuria, no increased urinary frequency  	•	MUSCULOSKELETAL: no joint paint, no swelling, no redness  	•	NEUROLOGIC: +generalized weakness

## 2021-02-09 NOTE — ED PROVIDER NOTE - PATIENT PORTAL LINK FT
You can access the FollowMyHealth Patient Portal offered by [facility] [place] by registering at the following website: [email or web address]. By joining [employer]’s FollowMyHealth portal, you will also be able to view your health information using other applications (apps) compatible with our system.

## 2021-02-09 NOTE — ED PROVIDER NOTE - PHYSICAL EXAMINATION
CONSTITUTIONAL: Well-developed; well-nourished; in no acute distress, nontoxic appearing  SKIN: skin exam is warm and dry,  HEAD: Normocephalic; atraumatic.  EYES: PERRL, 3 mm bilateral, no nystagmus, EOM intact; conjunctiva and sclera clear.  ENT: MMM  NECK: ROM intact.  CARD: S1, S2 normal, no murmur  RESP: Bibasilar crackles, no increased WOB   ABD: soft; non-distended; non-tender. No Rebound, No guarding  EXT: Normal ROM.   NEURO: awake, alert, following commands, oriented, grossly unremarkable. No Focal deficits. GCS 15.   PSYCH: Cooperative, appropriate.

## 2021-02-09 NOTE — ED ADULT NURSE NOTE - OBJECTIVE STATEMENT
Pt. dx with covid friday, saturday went to [facility] for SpO2 drop to 75% per pt, fevers up to 104 F, cp, and sob. Pt. stated "they just kicked me out of [facility] and my pulse ox was dropping." Pt. came to ED todat for Spo2 dropping, pt. 98% RA. Pt. c/o weakness, dizziness, cp, sob.

## 2021-02-09 NOTE — ED PROVIDER NOTE - PROGRESS NOTE DETAILS
CK: results discussed with patient and patient's sister, ari. educated on importance of returning if symptoms worsen. patient to continue to monitor o2 sat at home. patient verbalizes understanding and will fu with primary care doctor. patient o2 sat with ambulation is 93%-98% ORA, while sitting 99% ORA. No increased WOB.

## 2021-02-16 ENCOUNTER — APPOINTMENT (OUTPATIENT)
Dept: CARDIOLOGY | Facility: CLINIC | Age: 36
End: 2021-02-16

## 2021-03-01 ENCOUNTER — OUTPATIENT (OUTPATIENT)
Dept: OUTPATIENT SERVICES | Facility: HOSPITAL | Age: 36
LOS: 1 days | Discharge: HOME | End: 2021-03-01
Payer: COMMERCIAL

## 2021-03-01 DIAGNOSIS — R06.02 SHORTNESS OF BREATH: ICD-10-CM

## 2021-03-01 PROCEDURE — 71275 CT ANGIOGRAPHY CHEST: CPT | Mod: 26

## 2021-03-13 ENCOUNTER — APPOINTMENT (OUTPATIENT)
Dept: PULMONOLOGY | Facility: CLINIC | Age: 36
End: 2021-03-13
Payer: COMMERCIAL

## 2021-03-13 VITALS
HEART RATE: 81 BPM | HEIGHT: 68 IN | DIASTOLIC BLOOD PRESSURE: 70 MMHG | SYSTOLIC BLOOD PRESSURE: 124 MMHG | WEIGHT: 226 LBS | RESPIRATION RATE: 18 BRPM | OXYGEN SATURATION: 97 % | BODY MASS INDEX: 34.25 KG/M2

## 2021-03-13 DIAGNOSIS — Z78.9 OTHER SPECIFIED HEALTH STATUS: ICD-10-CM

## 2021-03-13 PROCEDURE — 99205 OFFICE O/P NEW HI 60 MIN: CPT

## 2021-03-13 PROCEDURE — 99072 ADDL SUPL MATRL&STAF TM PHE: CPT

## 2021-03-13 NOTE — PHYSICAL EXAM
[No Acute Distress] : no acute distress [Normal Oropharynx] : normal oropharynx [Normal Appearance] : normal appearance [Normal Pulses] : normal pulses [Normal PMI] : normal pmi [Normal S1, S2] : normal s1, s2 [No Varicosities] : no varicosities [No Edema] : no edema [No Murmurs] : no murmurs [No Rubs] : no rubs [No Gallops] : no gallops [No Resp Distress] : no resp distress [Clear to Auscultation Bilaterally] : clear to auscultation bilaterally [Not Tender] : not tender [No Masses] : no masses [Soft] : soft [No Hernias] : no hernias [No Focal Deficits] : no focal deficits [TextBox_2] : obese

## 2021-03-15 ENCOUNTER — NON-APPOINTMENT (OUTPATIENT)
Age: 36
End: 2021-03-15

## 2021-03-23 ENCOUNTER — APPOINTMENT (OUTPATIENT)
Dept: CARDIOLOGY | Facility: CLINIC | Age: 36
End: 2021-03-23
Payer: COMMERCIAL

## 2021-03-23 VITALS
WEIGHT: 260 LBS | HEART RATE: 84 BPM | SYSTOLIC BLOOD PRESSURE: 110 MMHG | HEIGHT: 67 IN | BODY MASS INDEX: 40.81 KG/M2 | RESPIRATION RATE: 18 BRPM | TEMPERATURE: 98.1 F | DIASTOLIC BLOOD PRESSURE: 78 MMHG | OXYGEN SATURATION: 98 %

## 2021-03-23 PROCEDURE — 99072 ADDL SUPL MATRL&STAF TM PHE: CPT

## 2021-03-23 PROCEDURE — 93000 ELECTROCARDIOGRAM COMPLETE: CPT

## 2021-03-23 PROCEDURE — 99214 OFFICE O/P EST MOD 30 MIN: CPT

## 2021-03-23 RX ORDER — BENZONATATE 100 MG/1
100 CAPSULE ORAL
Qty: 42 | Refills: 0 | Status: DISCONTINUED | COMMUNITY
Start: 2019-11-19 | End: 2021-03-23

## 2021-03-23 RX ORDER — CIPROFLOXACIN HYDROCHLORIDE 500 MG/1
500 TABLET, FILM COATED ORAL
Qty: 14 | Refills: 0 | Status: DISCONTINUED | COMMUNITY
Start: 2019-11-27 | End: 2021-03-23

## 2021-03-23 RX ORDER — ERENUMAB-AOOE 140 MG/ML
140 INJECTION, SOLUTION SUBCUTANEOUS
Qty: 3 | Refills: 3 | Status: DISCONTINUED | COMMUNITY
Start: 2020-02-05 | End: 2021-03-23

## 2021-03-23 RX ORDER — INDOMETHACIN 50 MG/1
50 CAPSULE ORAL
Qty: 90 | Refills: 0 | Status: DISCONTINUED | COMMUNITY
Start: 2020-05-11 | End: 2021-03-23

## 2021-03-23 RX ORDER — METHYLPREDNISOLONE 4 MG/1
4 TABLET ORAL
Qty: 1 | Refills: 0 | Status: DISCONTINUED | COMMUNITY
Start: 2020-11-23 | End: 2021-03-23

## 2021-03-23 RX ORDER — ONDANSETRON 4 MG/1
4 TABLET, ORALLY DISINTEGRATING ORAL
Qty: 42 | Refills: 0 | Status: DISCONTINUED | COMMUNITY
Start: 2019-10-01 | End: 2021-03-23

## 2021-03-23 RX ORDER — FLUOXETINE HYDROCHLORIDE 20 MG/1
20 CAPSULE ORAL DAILY
Qty: 120 | Refills: 5 | Status: DISCONTINUED | COMMUNITY
End: 2021-03-23

## 2021-03-23 RX ORDER — BUTALBITAL, ACETAMINOPHEN, CAFFEINE AND CODEINE PHOSPHATE 50; 325; 40; 30 MG/1; MG/1; MG/1; MG/1
50-325-40-30 CAPSULE ORAL
Qty: 60 | Refills: 0 | Status: DISCONTINUED | COMMUNITY
Start: 2020-08-11 | End: 2021-03-23

## 2021-03-23 RX ORDER — CHLORHEXIDINE GLUCONATE, 0.12% ORAL RINSE 1.2 MG/ML
0.12 SOLUTION DENTAL
Qty: 473 | Refills: 0 | Status: DISCONTINUED | COMMUNITY
Start: 2019-12-05 | End: 2021-03-23

## 2021-03-23 RX ORDER — DIAZEPAM 2 MG/1
2 TABLET ORAL
Refills: 0 | Status: DISCONTINUED | COMMUNITY
End: 2021-03-23

## 2021-03-23 RX ORDER — RANITIDINE 150 MG/1
150 TABLET ORAL
Qty: 30 | Refills: 0 | Status: DISCONTINUED | COMMUNITY
Start: 2019-09-19 | End: 2021-03-23

## 2021-03-23 RX ORDER — UBROGEPANT 50 MG/1
50 TABLET ORAL
Qty: 30 | Refills: 3 | Status: DISCONTINUED | COMMUNITY
Start: 2020-08-28 | End: 2021-03-23

## 2021-03-23 RX ORDER — FLUOXETINE HYDROCHLORIDE 20 MG/1
20 CAPSULE ORAL
Qty: 120 | Refills: 0 | Status: DISCONTINUED | COMMUNITY
Start: 2019-08-21 | End: 2021-03-23

## 2021-03-23 NOTE — PHYSICAL EXAM
[General Appearance - Well Developed] : well developed [Normal Appearance] : normal appearance [Well Groomed] : well groomed [General Appearance - Well Nourished] : well nourished [No Deformities] : no deformities [General Appearance - In No Acute Distress] : no acute distress [Normal Conjunctiva] : the conjunctiva exhibited no abnormalities [Eyelids - No Xanthelasma] : the eyelids demonstrated no xanthelasmas [Normal Oral Mucosa] : normal oral mucosa [No Oral Pallor] : no oral pallor [No Oral Cyanosis] : no oral cyanosis [Normal Jugular Venous A Waves Present] : normal jugular venous A waves present [Normal Jugular Venous V Waves Present] : normal jugular venous V waves present [No Jugular Venous Bello A Waves] : no jugular venous bello A waves [Respiration, Rhythm And Depth] : normal respiratory rhythm and effort [Exaggerated Use Of Accessory Muscles For Inspiration] : no accessory muscle use [Heart Rate And Rhythm] : heart rate and rhythm were normal [Heart Sounds] : normal S1 and S2 [Murmurs] : no murmurs present [Arterial Pulses Normal] : the arterial pulses were normal [Edema] : no peripheral edema present [Abdomen Soft] : soft [Abdomen Tenderness] : non-tender [Abdomen Mass (___ Cm)] : no abdominal mass palpated [Abnormal Walk] : normal gait [Gait - Sufficient For Exercise Testing] : the gait was sufficient for exercise testing [Nail Clubbing] : no clubbing of the fingernails [Cyanosis, Localized] : no localized cyanosis [Petechial Hemorrhages (___cm)] : no petechial hemorrhages [Skin Color & Pigmentation] : normal skin color and pigmentation [] : no rash [No Venous Stasis] : no venous stasis [Skin Lesions] : no skin lesions [No Skin Ulcers] : no skin ulcer [No Xanthoma] : no  xanthoma was observed [Oriented To Time, Place, And Person] : oriented to person, place, and time [Affect] : the affect was normal [Mood] : the mood was normal [No Anxiety] : not feeling anxious [FreeTextEntry1] : Decreased bs bilaterally.

## 2021-03-23 NOTE — ASSESSMENT
[FreeTextEntry1] : Tommy-Danlos Syndrome - stable\par Palpitations - stable\par Anxiety - stable\par Reactive hypoglycemia - stable\par Migrane headaches - stable\par COVID-19 - stable\par Shortness of breath - stable

## 2021-03-23 NOTE — HISTORY OF PRESENT ILLNESS
[FreeTextEntry1] : 36 year old female who came in for evaluation because of recent diagnosis of Ehler-Danlos syndrome. She still complains of occasional palpitations. She sometimes gets tachycardia and bradycardia. She also sometimes drops her BP. She sometimes has chest pains and shortness of breath at times. She also sometimes gets dizzy.\par The patient denies loss of consciousness, or swelling of the ankles.\par \par The patient had COVID-19 on 2/6/2021.  She is still having breathing problems.  The patient had chest pains since having had COVID-19.  The pains are worse on deep inspiration.  \par \par \par PMHx:\par \par Tommy-Danlos Syndrome - stable\par Palpitations - stable\par Anxiety - stable\par Reactive hypoglycemia - stable\par Migrane headaches - stable\par COVID-19 - stable\par Shortness of breath - stable\par \par Medications reviewed and reconciled with patient.  Patient is compliant with taking appropriate medications at appropriate doses at appropriate intervals without missing doses.\par

## 2021-03-23 NOTE — DISCUSSION/SUMMARY
[With Me] : with me [FreeTextEntry3] : After testing [FreeTextEntry1] : Ekg results were reviewed.\par \par Fasting CBC, BMP, LFTs, Lipid Profile, HgbA1c, CRP, UA, Urine Microalbumin, Mg, Ca, TSH, T3, T4, Troponin, D-dimer, BNP prior to next visit\par Echocardiogram - shortness of breath\par Holter Monitor - palpitaitons\par Abdominal aortic duplex scan - Ehler's Danlos\par f/u after testing\par \par Number/complexity of problems - Mod - 1 or more chronic illnesses with exacerbation, progression, or side effects of treatment\par Amount/complexity of data -history, exam, reviewed old note, ekg reviewed\par Risk of complications - Mod\par \par

## 2021-03-24 ENCOUNTER — APPOINTMENT (OUTPATIENT)
Dept: GASTROENTEROLOGY | Facility: CLINIC | Age: 36
End: 2021-03-24
Payer: COMMERCIAL

## 2021-03-24 DIAGNOSIS — Z78.9 OTHER SPECIFIED HEALTH STATUS: ICD-10-CM

## 2021-03-24 DIAGNOSIS — Z86.59 PERSONAL HISTORY OF OTHER MENTAL AND BEHAVIORAL DISORDERS: ICD-10-CM

## 2021-03-24 PROCEDURE — 99213 OFFICE O/P EST LOW 20 MIN: CPT | Mod: 95

## 2021-03-30 PROBLEM — Z78.9 SOCIAL ALCOHOL USE: Status: ACTIVE | Noted: 2021-03-24

## 2021-03-30 NOTE — REASON FOR VISIT
[Initial Eval - Existing Diagnosis] : an initial evaluation of an existing diagnosis [FreeTextEntry1] : GERD/Hx colonic polyps

## 2021-03-30 NOTE — ASSESSMENT
[FreeTextEntry1] : EGD/Colon\par Amitiza 24 mcg 1 capsule twice daily\par 3 week follow up \par Suprep ordered

## 2021-03-30 NOTE — HISTORY OF PRESENT ILLNESS
[Home] : at home, [unfilled] , at the time of the visit. [Medical Office: ([facility])___] : at the medical office located in  [Verbal consent obtained from patient] : the patient, [unfilled] [FreeTextEntry4] : Vandana Mattson [de-identified] : Hx of GERD & colon polyps, Takes OTC PPI & H2 blocker last colon/EGD: 3-4 years ago (impression: GERD & Colon Polyps), Dr. Moe. Of note, recent COVID infection approx 1.5 months from now & Tommy Danlos syndrome

## 2021-04-02 ENCOUNTER — APPOINTMENT (OUTPATIENT)
Dept: CARDIOLOGY | Facility: CLINIC | Age: 36
End: 2021-04-02
Payer: COMMERCIAL

## 2021-04-02 PROCEDURE — 93306 TTE W/DOPPLER COMPLETE: CPT

## 2021-04-02 PROCEDURE — 99072 ADDL SUPL MATRL&STAF TM PHE: CPT

## 2021-04-02 PROCEDURE — 93246 EXT ECG>7D<15D RECORDING: CPT

## 2021-04-07 ENCOUNTER — APPOINTMENT (OUTPATIENT)
Dept: SURGERY | Facility: CLINIC | Age: 36
End: 2021-04-07

## 2021-04-16 ENCOUNTER — APPOINTMENT (OUTPATIENT)
Dept: GASTROENTEROLOGY | Facility: CLINIC | Age: 36
End: 2021-04-16
Payer: COMMERCIAL

## 2021-04-16 ENCOUNTER — APPOINTMENT (OUTPATIENT)
Dept: CARDIOLOGY | Facility: CLINIC | Age: 36
End: 2021-04-16
Payer: COMMERCIAL

## 2021-04-16 PROCEDURE — 99072 ADDL SUPL MATRL&STAF TM PHE: CPT

## 2021-04-16 PROCEDURE — 99443: CPT

## 2021-04-16 PROCEDURE — 93978 VASCULAR STUDY: CPT

## 2021-04-19 NOTE — ASSESSMENT
[FreeTextEntry1] : Patient is a 37 y/o with Tommy Danlos, Hx of GERD, and Colonic polyps whom was followed for constipation. Patient notes amitiza did not help much and still with constipation. She notes she relies on Miralax daily despite using Amitiza. Will try Motegrity.\par \par Will try Motegriy \par Stop Amitiza \par Will come to office Wednesday for out of work note and samples\par EGD Colonoscopy

## 2021-04-19 NOTE — HISTORY OF PRESENT ILLNESS
[Home] : at home, [unfilled] , at the time of the visit. [Medical Office: ([facility])___] : at the medical office located in  [Verbal consent obtained from patient] : the patient, [unfilled] [de-identified] : Patient is a 35 y/o with Tommy Danlos, Hx of GERD, and Colonic polyps whom was followed for constipation. Patient notes amitiza did not help much and still with constipation. She notes she relies on Miralax daily despite using Amitiza.

## 2021-04-29 ENCOUNTER — APPOINTMENT (OUTPATIENT)
Dept: NEUROLOGY | Facility: CLINIC | Age: 36
End: 2021-04-29
Payer: COMMERCIAL

## 2021-04-29 PROCEDURE — 99213 OFFICE O/P EST LOW 20 MIN: CPT | Mod: 95

## 2021-04-29 NOTE — HISTORY OF PRESENT ILLNESS
[FreeTextEntry1] : Ms. Deshpande is a 37yo woman being followed for her neck and lower back issues as well as her migraines.  Since her fall in November she has been noticing worsening back pain and recently it has gotten slightly better with OTC NSAIDs and home exercise.  She still has pain and has difficulty leaning forward and carrying things.  She is going for bariatric surgery with balloon in 6/17/2021 and was told she needs to stop NSAIDS.  She scheduled for botox injections for her migraines to try and reduce the amount of NSAIDs shes taking.  She tried Aimovig 140mg monthly with no improvement and is asking about other options

## 2021-04-29 NOTE — REASON FOR VISIT
[Home] : at home, [unfilled] , at the time of the visit. [Medical Office: ([facility])___] : at the medical office located in  [Verbal consent obtained from patient] : the patient, [unfilled]

## 2021-04-29 NOTE — DISCUSSION/SUMMARY
[FreeTextEntry1] : Ms. Deshpande is a 37yo woman being seen for follow up of her lower back pain, neck pain and migraines.  She has been using OTC anti-inflammatory medications with some slight improvement in back pain but still notices significant pain mostly on the right lower back radiating down her leg.  She has been trying exercises she did with PT in the past but does not feel any improvement.  Because of upcoming bariatric surgery she will try botox to reduce the amount of NSAIDs she takes for her back.   Would recommend trying PT again to see if she can make any further improvement.\par 1. Emgality 120x2 first month then 1 dose monthly afterwards\par 2. Refilled her other medications\par 3. Follow with a psychiatrist\par 4. OK for gastric procedure\par 5. PT for back and neck\par

## 2021-05-04 ENCOUNTER — LABORATORY RESULT (OUTPATIENT)
Age: 36
End: 2021-05-04

## 2021-05-24 ENCOUNTER — APPOINTMENT (OUTPATIENT)
Dept: SURGERY | Facility: CLINIC | Age: 36
End: 2021-05-24
Payer: COMMERCIAL

## 2021-06-11 ENCOUNTER — APPOINTMENT (OUTPATIENT)
Dept: NEUROLOGY | Facility: CLINIC | Age: 36
End: 2021-06-11
Payer: COMMERCIAL

## 2021-06-11 ENCOUNTER — RESULT REVIEW (OUTPATIENT)
Age: 36
End: 2021-06-11

## 2021-06-11 VITALS
HEART RATE: 75 BPM | WEIGHT: 270 LBS | DIASTOLIC BLOOD PRESSURE: 81 MMHG | TEMPERATURE: 97.1 F | BODY MASS INDEX: 42.38 KG/M2 | SYSTOLIC BLOOD PRESSURE: 117 MMHG | HEIGHT: 67 IN | OXYGEN SATURATION: 99 %

## 2021-06-11 PROCEDURE — 99072 ADDL SUPL MATRL&STAF TM PHE: CPT

## 2021-06-11 PROCEDURE — 99213 OFFICE O/P EST LOW 20 MIN: CPT

## 2021-06-14 NOTE — HISTORY OF PRESENT ILLNESS
[FreeTextEntry1] : Ms. Deshpande is a 35yo woman being followed for her neck and lower back issues as well as her migraines.  Since her fall in November she has been noticing worsening back pain and recently it has gotten slightly better with OTC NSAIDs and home exercise.  Recenty after drinking heavily she passed out and hit her head.  She was getting more headaches and neck pain after this new fall and was also noticing some dizziness and slow thinking.  Saw another neurologist who did an MRI brain which she reports was unremarkable.  She is waiting to get the MRI cervical spine done because while her headaches have improved her neck pain is worse.\par The emgality has helped significantly with migraines and they have been reduced to almost none to 1 per month.

## 2021-06-14 NOTE — DISCUSSION/SUMMARY
[FreeTextEntry1] : Ms. Deshpande is a 37yo woman being seen for follow up of her lower back pain, neck pain and migraines.  She has been using OTC anti-inflammatory medications with some slight improvement in back pain but still notices significant pain mostly on the right lower back radiating down her leg.  She has been trying exercises she did with PT in the past but does not feel any improvement.  Because of upcoming bariatric surgery she will try botox to reduce the amount of NSAIDs she takes for her back.   Would recommend trying PT again to see if she can make any further improvement.\par 1. Emgality 240mg/month \par 2. MRI cervical spine w/o RENATO (recent fall and workup for concussion with worsening neck pain and spinal stenosis)\par 3. MRI LS spine w/o RENATO\par 4. Refilled her adderall and diazepam\par

## 2021-06-21 ENCOUNTER — EMERGENCY (EMERGENCY)
Facility: HOSPITAL | Age: 36
LOS: 0 days | Discharge: HOME | End: 2021-06-21
Attending: EMERGENCY MEDICINE | Admitting: EMERGENCY MEDICINE
Payer: COMMERCIAL

## 2021-06-21 VITALS
DIASTOLIC BLOOD PRESSURE: 79 MMHG | HEART RATE: 73 BPM | HEIGHT: 67 IN | SYSTOLIC BLOOD PRESSURE: 120 MMHG | RESPIRATION RATE: 18 BRPM | OXYGEN SATURATION: 98 % | WEIGHT: 169.98 LBS

## 2021-06-21 VITALS
HEART RATE: 70 BPM | RESPIRATION RATE: 18 BRPM | SYSTOLIC BLOOD PRESSURE: 115 MMHG | OXYGEN SATURATION: 99 % | TEMPERATURE: 98 F | DIASTOLIC BLOOD PRESSURE: 62 MMHG

## 2021-06-21 DIAGNOSIS — R10.13 EPIGASTRIC PAIN: ICD-10-CM

## 2021-06-21 DIAGNOSIS — F41.9 ANXIETY DISORDER, UNSPECIFIED: ICD-10-CM

## 2021-06-21 DIAGNOSIS — R19.7 DIARRHEA, UNSPECIFIED: ICD-10-CM

## 2021-06-21 DIAGNOSIS — R11.2 NAUSEA WITH VOMITING, UNSPECIFIED: ICD-10-CM

## 2021-06-21 LAB
ALBUMIN SERPL ELPH-MCNC: 4.3 G/DL — SIGNIFICANT CHANGE UP (ref 3.5–5.2)
ALP SERPL-CCNC: 89 U/L — SIGNIFICANT CHANGE UP (ref 30–115)
ALT FLD-CCNC: 24 U/L — SIGNIFICANT CHANGE UP (ref 0–41)
ANION GAP SERPL CALC-SCNC: 11 MMOL/L — SIGNIFICANT CHANGE UP (ref 7–14)
AST SERPL-CCNC: 23 U/L — SIGNIFICANT CHANGE UP (ref 0–41)
BASOPHILS # BLD AUTO: 0.03 K/UL — SIGNIFICANT CHANGE UP (ref 0–0.2)
BASOPHILS NFR BLD AUTO: 0.3 % — SIGNIFICANT CHANGE UP (ref 0–1)
BILIRUB SERPL-MCNC: 0.4 MG/DL — SIGNIFICANT CHANGE UP (ref 0.2–1.2)
BUN SERPL-MCNC: 11 MG/DL — SIGNIFICANT CHANGE UP (ref 10–20)
CALCIUM SERPL-MCNC: 9.2 MG/DL — SIGNIFICANT CHANGE UP (ref 8.5–10.1)
CHLORIDE SERPL-SCNC: 96 MMOL/L — LOW (ref 98–110)
CO2 SERPL-SCNC: 29 MMOL/L — SIGNIFICANT CHANGE UP (ref 17–32)
CREAT SERPL-MCNC: 1 MG/DL — SIGNIFICANT CHANGE UP (ref 0.7–1.5)
EOSINOPHIL # BLD AUTO: 0.1 K/UL — SIGNIFICANT CHANGE UP (ref 0–0.7)
EOSINOPHIL NFR BLD AUTO: 0.8 % — SIGNIFICANT CHANGE UP (ref 0–8)
GLUCOSE SERPL-MCNC: 87 MG/DL — SIGNIFICANT CHANGE UP (ref 70–99)
HCG SERPL QL: NEGATIVE — SIGNIFICANT CHANGE UP
HCT VFR BLD CALC: 42.9 % — SIGNIFICANT CHANGE UP (ref 37–47)
HGB BLD-MCNC: 14.3 G/DL — SIGNIFICANT CHANGE UP (ref 12–16)
IMM GRANULOCYTES NFR BLD AUTO: 0.3 % — SIGNIFICANT CHANGE UP (ref 0.1–0.3)
LYMPHOCYTES # BLD AUTO: 1.1 K/UL — LOW (ref 1.2–3.4)
LYMPHOCYTES # BLD AUTO: 9.3 % — LOW (ref 20.5–51.1)
MCHC RBC-ENTMCNC: 26.3 PG — LOW (ref 27–31)
MCHC RBC-ENTMCNC: 33.3 G/DL — SIGNIFICANT CHANGE UP (ref 32–37)
MCV RBC AUTO: 78.9 FL — LOW (ref 81–99)
MONOCYTES # BLD AUTO: 0.92 K/UL — HIGH (ref 0.1–0.6)
MONOCYTES NFR BLD AUTO: 7.8 % — SIGNIFICANT CHANGE UP (ref 1.7–9.3)
NEUTROPHILS # BLD AUTO: 9.66 K/UL — HIGH (ref 1.4–6.5)
NEUTROPHILS NFR BLD AUTO: 81.5 % — HIGH (ref 42.2–75.2)
NRBC # BLD: 0 /100 WBCS — SIGNIFICANT CHANGE UP (ref 0–0)
PLATELET # BLD AUTO: 200 K/UL — SIGNIFICANT CHANGE UP (ref 130–400)
POTASSIUM SERPL-MCNC: 4.2 MMOL/L — SIGNIFICANT CHANGE UP (ref 3.5–5)
POTASSIUM SERPL-SCNC: 4.2 MMOL/L — SIGNIFICANT CHANGE UP (ref 3.5–5)
PROT SERPL-MCNC: 6.9 G/DL — SIGNIFICANT CHANGE UP (ref 6–8)
RBC # BLD: 5.44 M/UL — HIGH (ref 4.2–5.4)
RBC # FLD: 12.5 % — SIGNIFICANT CHANGE UP (ref 11.5–14.5)
SODIUM SERPL-SCNC: 136 MMOL/L — SIGNIFICANT CHANGE UP (ref 135–146)
WBC # BLD: 11.85 K/UL — HIGH (ref 4.8–10.8)
WBC # FLD AUTO: 11.85 K/UL — HIGH (ref 4.8–10.8)

## 2021-06-21 PROCEDURE — 74177 CT ABD & PELVIS W/CONTRAST: CPT | Mod: 26,MA

## 2021-06-21 PROCEDURE — 99284 EMERGENCY DEPT VISIT MOD MDM: CPT

## 2021-06-21 RX ORDER — FAMOTIDINE 10 MG/ML
20 INJECTION INTRAVENOUS ONCE
Refills: 0 | Status: DISCONTINUED | OUTPATIENT
Start: 2021-06-21 | End: 2021-06-21

## 2021-06-21 RX ORDER — IOHEXOL 300 MG/ML
30 INJECTION, SOLUTION INTRAVENOUS ONCE
Refills: 0 | Status: COMPLETED | OUTPATIENT
Start: 2021-06-21 | End: 2021-06-21

## 2021-06-21 RX ORDER — ONDANSETRON 8 MG/1
4 TABLET, FILM COATED ORAL ONCE
Refills: 0 | Status: COMPLETED | OUTPATIENT
Start: 2021-06-21 | End: 2021-06-21

## 2021-06-21 RX ORDER — SODIUM CHLORIDE 9 MG/ML
1000 INJECTION, SOLUTION INTRAVENOUS ONCE
Refills: 0 | Status: COMPLETED | OUTPATIENT
Start: 2021-06-21 | End: 2021-06-21

## 2021-06-21 RX ORDER — PANTOPRAZOLE SODIUM 20 MG/1
8 TABLET, DELAYED RELEASE ORAL
Qty: 80 | Refills: 0 | Status: DISCONTINUED | OUTPATIENT
Start: 2021-06-21 | End: 2021-06-21

## 2021-06-21 RX ORDER — FAMOTIDINE 10 MG/ML
20 INJECTION INTRAVENOUS ONCE
Refills: 0 | Status: COMPLETED | OUTPATIENT
Start: 2021-06-21 | End: 2021-06-21

## 2021-06-21 RX ADMIN — FAMOTIDINE 20 MILLIGRAM(S): 10 INJECTION INTRAVENOUS at 14:41

## 2021-06-21 RX ADMIN — SODIUM CHLORIDE 1000 MILLILITER(S): 9 INJECTION, SOLUTION INTRAVENOUS at 14:10

## 2021-06-21 RX ADMIN — ONDANSETRON 4 MILLIGRAM(S): 8 TABLET, FILM COATED ORAL at 14:10

## 2021-06-21 NOTE — ED ADULT NURSE NOTE - NSFALLRSKUNASSIST_ED_ALL_ED
Detail Level: Detailed Add 61631 Cpt? (Important Note: In 2017 The Use Of 55018 Is Being Tracked By Cms To Determine Future Global Period Reimbursement For Global Periods): yes Body Location Override (Optional - Billing Will Still Be Based On Selected Body Map Location If Applicable): right upper cutaneous lip no

## 2021-06-21 NOTE — ED ADULT TRIAGE NOTE - CHIEF COMPLAINT QUOTE
pt states "I had a gastric balloon on Thursday 6/17; I have not been able to eat anything at all since then without throwing up, even sips of Pedialyte"

## 2021-06-21 NOTE — ED PROVIDER NOTE - PROGRESS NOTE DETAILS
I personally evaluated the patient. I reviewed the Resident’s or Physician Assistant’s note (as assigned above), and agree with the findings and plan except as documented in my note.  37 y/o F with PMHx anxiety presents with abdominal pain associated with nausea and vomiting x4 days s/p gastric balloon done by Dr. Shepherd at [facility] 5 days ago. Pt states she has had constant indigestion and abdominal pain since the procedure and 1-2 episodes of vomiting every day since. She notes the first day of vomiting was bloody, but has had no blood since. She states she cant hold any food or liquid down. No fever, chills, CP, SOB, diarrhea or urinary sxs. On exam: NCAT. PERRLA, EOMI. OP clear. Lungs CTAB. RRR, S1S2 noted. Radial pulses 2+ and equal, pedal pulses 2+ and equal. Abdomen +mild epigastric distention, soft, NT, no rebound or guarding. FROM x4 extremities. No focal neuro deficits. A/P: pt is post-op, therefore will obtain labs and CT. Pt given IV Protonix and IVF. Will continue to monitor. results discussed. patient feels better. patient is asking to eat patient improved tolerating po discussed case with on call surgeon for dr miner who advises iv fluid, and discharge with outpatient management at this time

## 2021-06-21 NOTE — ED PROVIDER NOTE - CLINICAL SUMMARY MEDICAL DECISION MAKING FREE TEXT BOX
patient is s/p gastric balloon placement pod#5 with complains of nausea and burning epigastric pain. we obtained labs as well as ct which reveals adequate placement and no complications patient given pepcid and iv fluids she improved I have discussed case with on call physical for dr miner advised to discharge with follow up as an outpatient

## 2021-06-21 NOTE — ED PROVIDER NOTE - OBJECTIVE STATEMENT
this is 35 yo female presents to ed for evaluation of nausea and vomiting . patient has gastric balloon placed . patient states that she is having symptoms since it was placed. patient states that she was told that this would occur until her body got used to it. patient states that she called office today and was told to come to ed for hydration.

## 2021-06-21 NOTE — ED PROVIDER NOTE - ATTENDING CONTRIBUTION TO CARE
I was present for and supervised the key and critical aspects of the procedures performed during the care of the patient. I personally evaluated the patient. I reviewed the Resident’s or Physician Assistant’s note (as assigned above), and agree with the findings and plan except as documented in my note.  35 y/o F with PMHx anxiety presents with abdominal pain associated with nausea and vomiting x4 days s/p gastric balloon done by Dr. Shepherd at [facility] 5 days ago. Pt states she has had constant indigestion and abdominal pain since the procedure and 1-2 episodes of vomiting every day since. She notes the first day of vomiting was bloody, but has had no blood since. She states she cant hold any food or liquid down. No fever, chills, CP, SOB, diarrhea or urinary sxs. On exam: NCAT. PERRLA, EOMI. OP clear. Lungs CTAB. RRR, S1S2 noted. Radial pulses 2+ and equal, pedal pulses 2+ and equal. Abdomen +mild epigastric distention, soft, NT, no rebound or guarding. FROM x4 extremities. No focal neuro deficits. A/P: pt is post-op, therefore will obtain labs and CT. Pt given IV Protonix and IVF. Will continue to monitor.

## 2021-06-21 NOTE — ED PROVIDER NOTE - TOBACCO USE
Never smoker
I will START or STAY ON the medications listed below when I get home from the hospital:  None

## 2021-06-21 NOTE — ED PROVIDER NOTE - NS ED ROS FT
Review of Systems:  	•	CONSTITUTIONAL - no fever, no diaphoresis, no chills  	•	SKIN - no rash  	•	HEMATOLOGIC - no bleeding, no bruising  	•	EYES - no eye pain, no blurry vision  	•	ENT - no change in hearing, no sore throat, no ear pain or tinnitus  	•	RESPIRATORY - no shortness of breath, no cough  	•	CARDIAC - no chest pain, no palpitations  	•	GI - no abd pain, positive nausea, and  vomiting, no diarrhea, no constipation  	•	GENITO-URINARY - no discharge, no dysuria; no hematuria, no increased urinary frequency  	•	MUSCULOSKELETAL - no joint paint, no swelling, no redness  	•	NEUROLOGIC - no weakness, no headache, no paresthesias, no LOC  	•	PSYCH - no anxiety, non suicidal, non homicidal, no hallucination, no depression

## 2021-06-21 NOTE — ED PROVIDER NOTE - PATIENT PORTAL LINK FT
You can access the FollowMyHealth Patient Portal offered by [facility] by registering at the following website: [email or web address]. By joining [facility]’s FollowMyHealth portal, you will also be able to view your health information using other applications (apps) compatible with our system.

## 2021-07-08 ENCOUNTER — APPOINTMENT (OUTPATIENT)
Dept: CARDIOLOGY | Facility: CLINIC | Age: 36
End: 2021-07-08
Payer: COMMERCIAL

## 2021-07-08 VITALS
DIASTOLIC BLOOD PRESSURE: 70 MMHG | OXYGEN SATURATION: 98 % | HEART RATE: 66 BPM | TEMPERATURE: 97.7 F | BODY MASS INDEX: 42.22 KG/M2 | HEIGHT: 67 IN | WEIGHT: 269 LBS | SYSTOLIC BLOOD PRESSURE: 110 MMHG | RESPIRATION RATE: 16 BRPM

## 2021-07-08 VITALS
RESPIRATION RATE: 16 BRPM | TEMPERATURE: 97.7 F | DIASTOLIC BLOOD PRESSURE: 70 MMHG | SYSTOLIC BLOOD PRESSURE: 110 MMHG | HEIGHT: 67 IN | OXYGEN SATURATION: 98 % | HEART RATE: 66 BPM | WEIGHT: 269 LBS | BODY MASS INDEX: 42.22 KG/M2

## 2021-07-08 DIAGNOSIS — Z00.00 ENCOUNTER FOR GENERAL ADULT MEDICAL EXAMINATION W/OUT ABNORMAL FINDINGS: ICD-10-CM

## 2021-07-08 PROCEDURE — 99072 ADDL SUPL MATRL&STAF TM PHE: CPT

## 2021-07-08 PROCEDURE — 93000 ELECTROCARDIOGRAM COMPLETE: CPT

## 2021-07-08 PROCEDURE — 99214 OFFICE O/P EST MOD 30 MIN: CPT

## 2021-07-08 RX ORDER — PRUCALOPRIDE 2 MG/1
2 TABLET, FILM COATED ORAL
Qty: 30 | Refills: 3 | Status: DISCONTINUED | COMMUNITY
Start: 2021-04-19 | End: 2021-07-08

## 2021-07-08 RX ORDER — LUBIPROSTONE 24 UG/1
24 CAPSULE, GELATIN COATED ORAL TWICE DAILY
Qty: 60 | Refills: 0 | Status: DISCONTINUED | COMMUNITY
Start: 2021-03-24 | End: 2021-07-08

## 2021-07-08 RX ORDER — DEXAMETHASONE 0.5 MG/.5MG
0.5 TABLET ORAL DAILY
Refills: 0 | Status: DISCONTINUED | COMMUNITY
End: 2021-07-08

## 2021-07-08 RX ORDER — GALCANEZUMAB 120 MG/ML
120 INJECTION, SOLUTION SUBCUTANEOUS
Qty: 3 | Refills: 3 | Status: DISCONTINUED | OUTPATIENT
Start: 2021-04-29 | End: 2021-07-08

## 2021-07-08 NOTE — REASON FOR VISIT
[Follow-Up - Clinic] : a clinic follow-up of [Palpitations] : palpitations [Symptom and Test Evaluation] : symptom and test evaluation [Carotid, Aortic and Peripheral Vascular Disease] : carotid, aortic and peripheral vascular disease

## 2021-07-08 NOTE — DISCUSSION/SUMMARY
[With Me] : with me [FreeTextEntry1] : Ekg results were reviewed.\par Echocardiogram results were reviewed.\par Abdominal aortic duplex reviewed.\par Holter monitor results were reviewed.\par \par \par \par Fasting CBC, BMP, LFTs, Lipid Profile, HgbA1c, CRP, UA, Urine Microalbumin, Mg, Ca, TSH, T3, T4, Troponin, D-dimer, BNP prior to next visit\par f/u 1 year\par \par Number/complexity of problems -  Mod - 2 or more stable chronic illnesses\par Amount/complexity of data -history, exam, reviewed old note, labs reviewed, ekg reviewed, echo reviewed, holter reviewed, abd. aorta duplex reviewed\par Risk of complications - Low\par \par \par Number/complexity of problems - Mod - 1 or more chronic illnesses with exacerbation, progression, or side effects of treatment\par Amount/complexity of data -history, exam, reviewed old note, ekg reviewed\par Risk of complications - Mod\par \par

## 2021-07-08 NOTE — HISTORY OF PRESENT ILLNESS
[FreeTextEntry1] : 36 year old female who came in for evaluation because of recent diagnosis of Ehler-Danlos syndrome. She still complains of occasional palpitations. She sometimes gets tachycardia and bradycardia. She also sometimes drops her BP. She sometimes has chest pains and shortness of breath at times. She also sometimes gets dizzy.\par The patient denies loss of consciousness, or swelling of the ankles.\par \par The patient had COVID-19 on 2/6/2021.  She is still having breathing problems.  The patient had chest pains since having had COVID-19.  The pains are worse on deep inspiration.  \par \par The patient is s/p gastric balloon placememnt.\par \par \par PMHx:\par \par Tommy-Danlos Syndrome - stable\par Palpitations - stable\par Anxiety - stable\par Reactive hypoglycemia - stable\par Migrane headaches - stable\par COVID-19 - stable\par Shortness of breath - stable\par \par Medications reviewed and reconciled with patient.  Patient is compliant with taking appropriate medications at appropriate doses at appropriate intervals without missing doses.\par

## 2021-07-09 ENCOUNTER — RX RENEWAL (OUTPATIENT)
Age: 36
End: 2021-07-09

## 2021-07-12 ENCOUNTER — APPOINTMENT (OUTPATIENT)
Dept: SURGERY | Facility: CLINIC | Age: 36
End: 2021-07-12
Payer: COMMERCIAL

## 2021-07-12 VITALS
HEART RATE: 72 BPM | BODY MASS INDEX: 42.38 KG/M2 | HEIGHT: 67 IN | TEMPERATURE: 97.9 F | WEIGHT: 270 LBS | DIASTOLIC BLOOD PRESSURE: 76 MMHG | SYSTOLIC BLOOD PRESSURE: 126 MMHG

## 2021-07-12 DIAGNOSIS — N81.6 RECTOCELE: ICD-10-CM

## 2021-07-12 PROCEDURE — 99072 ADDL SUPL MATRL&STAF TM PHE: CPT

## 2021-07-12 PROCEDURE — 99204 OFFICE O/P NEW MOD 45 MIN: CPT

## 2021-07-15 ENCOUNTER — NON-APPOINTMENT (OUTPATIENT)
Age: 36
End: 2021-07-15

## 2021-07-15 NOTE — ASSESSMENT
[FreeTextEntry1] : 36F with constipation and possible rectocele\par \par I recommended that the patient continue with Miralax and add a fiber supplement.  We will obtain a MR defecography to evaluate for a rectocele.  We will see her back after her images.

## 2021-07-15 NOTE — CONSULT LETTER
[Dear  ___] : Dear  [unfilled], [Courtesy Letter:] : I had the pleasure of seeing your patient, [unfilled], in my office today. [Please see my note below.] : Please see my note below. [Consult Closing:] : Thank you very much for allowing me to participate in the care of this patient.  If you have any questions, please do not hesitate to contact me. [FreeTextEntry3] : Sincerely,\par \par Jean Wright MD, Colon and Rectal Surgery\par \par [employer] [facility]\par [address] [place] [address]\par [facility], 3rd Floor\par [place], [place] [address]\par Tel [phone] ext [phone]\par Fax [phone]\par

## 2021-07-15 NOTE — PHYSICAL EXAM
[Abdomen Masses] : No abdominal masses [Abdomen Tenderness] : ~T No ~M abdominal tenderness [No HSM] : no hepatosplenomegaly [Respiratory Effort] : normal respiratory effort [Normal Rate and Rhythm] : normal rate and rhythm [de-identified] : awake, alert and in no acute distress

## 2021-08-01 ENCOUNTER — OUTPATIENT (OUTPATIENT)
Dept: OUTPATIENT SERVICES | Facility: HOSPITAL | Age: 36
LOS: 1 days | Discharge: HOME | End: 2021-08-01
Payer: COMMERCIAL

## 2021-08-01 DIAGNOSIS — E28.2 POLYCYSTIC OVARIAN SYNDROME: ICD-10-CM

## 2021-08-01 DIAGNOSIS — R10.2 PELVIC AND PERINEAL PAIN: ICD-10-CM

## 2021-08-01 PROCEDURE — 76830 TRANSVAGINAL US NON-OB: CPT | Mod: 26

## 2021-08-12 ENCOUNTER — RESULT REVIEW (OUTPATIENT)
Age: 36
End: 2021-08-12

## 2021-08-12 ENCOUNTER — OUTPATIENT (OUTPATIENT)
Dept: OUTPATIENT SERVICES | Facility: HOSPITAL | Age: 36
LOS: 1 days | Discharge: HOME | End: 2021-08-12
Payer: COMMERCIAL

## 2021-08-12 ENCOUNTER — TRANSCRIPTION ENCOUNTER (OUTPATIENT)
Age: 36
End: 2021-08-12

## 2021-08-12 VITALS
TEMPERATURE: 97 F | RESPIRATION RATE: 16 BRPM | OXYGEN SATURATION: 100 % | HEART RATE: 63 BPM | SYSTOLIC BLOOD PRESSURE: 111 MMHG | DIASTOLIC BLOOD PRESSURE: 60 MMHG

## 2021-08-12 VITALS
RESPIRATION RATE: 18 BRPM | HEART RATE: 65 BPM | DIASTOLIC BLOOD PRESSURE: 65 MMHG | OXYGEN SATURATION: 98 % | SYSTOLIC BLOOD PRESSURE: 103 MMHG

## 2021-08-12 DIAGNOSIS — M48.9 SPONDYLOPATHY, UNSPECIFIED: ICD-10-CM

## 2021-08-12 PROCEDURE — 45380 COLONOSCOPY AND BIOPSY: CPT | Mod: 59

## 2021-08-12 PROCEDURE — 45385 COLONOSCOPY W/LESION REMOVAL: CPT

## 2021-08-12 PROCEDURE — 88305 TISSUE EXAM BY PATHOLOGIST: CPT | Mod: 26

## 2021-08-12 PROCEDURE — 72141 MRI NECK SPINE W/O DYE: CPT | Mod: 26

## 2021-08-12 NOTE — H&P PST ADULT - HISTORY OF PRESENT ILLNESS
Patient is a 35 y/o with Tommy Danlos, Hx of GERD, and Colonic polyps is here for colonoscopy  hx of constipation. Patient notes amitiza did not help much and still with constipation. She notes she relies on Miralax daily despite using Amitiza.

## 2021-08-12 NOTE — CHART NOTE - NSCHARTNOTEFT_GEN_A_CORE
PACU ANESTHESIA ADMISSION NOTE      Procedure: Colonoscopy  Post op diagnosis:      ____  Intubated  TV:______       Rate: ______      FiO2: ______    ___X_  Patent Airway    __X__  Full return of protective reflexes    __X__  Full recovery from anesthesia / back to baseline status    Vitals:  T: 97.2F  HR: -67  BP: --118/67  RR: --17  SpO2: --98%    Mental Status:  _X___ Awake   _____ Alert   _____ Drowsy   _____ Sedated    Nausea/Vomiting:  ____X NO  ______Yes,   See Post - Op Orders          Pain Scale (0-10):  _____    Treatment: ____ None    __X__ See Post - Op/PCA Orders    Post - Operative Fluids:   ____ Oral   ___X_ See Post - Op Orders    Plan: Discharge:   __X HOME    Pt. tolerated procedure well, transported to PACU, report endorsed to Rn

## 2021-08-12 NOTE — ASU DISCHARGE PLAN (ADULT/PEDIATRIC) - CARE PROVIDER_API CALL
Donato Schaffer)  Gastroenterology; Internal Medicine  [address] [place] [address]  Phone: [phone]  Fax: [phone]  Follow Up Time:

## 2021-08-13 LAB — SURGICAL PATHOLOGY STUDY: SIGNIFICANT CHANGE UP

## 2021-08-17 ENCOUNTER — RX RENEWAL (OUTPATIENT)
Age: 36
End: 2021-08-17

## 2021-08-17 DIAGNOSIS — R19.4 CHANGE IN BOWEL HABIT: ICD-10-CM

## 2021-08-17 DIAGNOSIS — Z86.010 PERSONAL HISTORY OF COLONIC POLYPS: ICD-10-CM

## 2021-08-17 DIAGNOSIS — D12.2 BENIGN NEOPLASM OF ASCENDING COLON: ICD-10-CM

## 2021-08-17 DIAGNOSIS — F41.9 ANXIETY DISORDER, UNSPECIFIED: ICD-10-CM

## 2021-08-17 DIAGNOSIS — D12.4 BENIGN NEOPLASM OF DESCENDING COLON: ICD-10-CM

## 2021-08-17 DIAGNOSIS — R15.0 INCOMPLETE DEFECATION: ICD-10-CM

## 2021-08-17 DIAGNOSIS — K52.9 NONINFECTIVE GASTROENTERITIS AND COLITIS, UNSPECIFIED: ICD-10-CM

## 2021-08-17 PROBLEM — Z86.39 PERSONAL HISTORY OF OTHER ENDOCRINE, NUTRITIONAL AND METABOLIC DISEASE: Chronic | Status: ACTIVE | Noted: 2021-08-12

## 2021-10-07 NOTE — ED ADULT NURSE NOTE - NSIMPLEMENTINTERV_GEN_ALL_ED
Implemented All Universal Safety Interventions:  [place]ayer to call system. Call bell, personal items and telephone within reach. Instruct patient to call for assistance. Room bathroom lighting operational. Non-slip footwear when patient is off stretcher. Physically safe environment: no spills, clutter or unnecessary equipment. Stretcher in lowest position, wheels locked, appropriate side rails in place.
3

## 2021-10-13 ENCOUNTER — APPOINTMENT (OUTPATIENT)
Dept: NEUROSURGERY | Facility: CLINIC | Age: 36
End: 2021-10-13
Payer: MEDICAID

## 2021-10-13 PROCEDURE — 99213 OFFICE O/P EST LOW 20 MIN: CPT

## 2021-10-13 RX ORDER — DAPSONE 50 MG/G
5 GEL TOPICAL
Qty: 60 | Refills: 0 | Status: DISCONTINUED | COMMUNITY
Start: 2021-01-18 | End: 2021-10-13

## 2021-10-13 NOTE — ASSESSMENT
[FreeTextEntry1] : At this time I am not recommending surgery.  Her C5-6 disc bulge is improved compared to 2019 and she has mild symptoms.  She reports increase in her low back pain radiating to her legs.  MRI lumbar spine ordered.

## 2021-10-13 NOTE — HISTORY OF PRESENT ILLNESS
[FreeTextEntry1] : Ms. Deshpande, presents today in follow up reporting of low back pain/tenderness for 3 years, associated with numbness on the right lateral thigh. Denies radicular leg pain. Reports of falls last year. Pain is worse when bending. \par SHe is also here to discuss if cervical spine surgery is necessary prior MRI in 2019 showed C5-6 disc herniation \par \par MRI of the cervical spine w/o contrast on 8/2021 ( [facility]) showed C5-6, moderate stenosis, and foraminal stenosis

## 2021-10-20 ENCOUNTER — APPOINTMENT (OUTPATIENT)
Dept: GASTROENTEROLOGY | Facility: CLINIC | Age: 36
End: 2021-10-20
Payer: MEDICAID

## 2021-10-20 PROCEDURE — 99443: CPT

## 2021-10-21 ENCOUNTER — APPOINTMENT (OUTPATIENT)
Dept: NEUROLOGY | Facility: CLINIC | Age: 36
End: 2021-10-21
Payer: MEDICAID

## 2021-10-21 VITALS
OXYGEN SATURATION: 98 % | WEIGHT: 260 LBS | DIASTOLIC BLOOD PRESSURE: 75 MMHG | HEIGHT: 67 IN | HEART RATE: 72 BPM | BODY MASS INDEX: 40.81 KG/M2 | TEMPERATURE: 97.9 F | SYSTOLIC BLOOD PRESSURE: 135 MMHG

## 2021-10-21 PROCEDURE — 99214 OFFICE O/P EST MOD 30 MIN: CPT

## 2021-11-02 NOTE — HISTORY OF PRESENT ILLNESS
[FreeTextEntry1] : Ms. Deshpande is a 37yo woman being seen for follow up of her lower back pain, neck pain and migraines.  She has had increasing lower back pain over the last few months and her neck pain has improved.  \par She saw neurosurgery and was not deemed a candidate for surgery.  She would like surgery because she is afraid as she is getting older she will then need surgery and it will be harder for her.  Since her pain has improved then surgery is not necessary.\par \par The emgality has helped significantly with migraines and they have been reduced to almost none to 1 per month.

## 2021-11-02 NOTE — DISCUSSION/SUMMARY
[FreeTextEntry1] : Ms. Deshpande is a 35yo woman being seen for follow up of her lower back pain, neck pain and migraines.  She has had increasing lower back pain over the last few months and her neck pain has improved.  \par She saw neurosurgery and was not deemed a candidate for surgery.  She would like surgery because she is afraid as she is getting older she will then need surgery and it will be harder for her.  Since her pain has improved then surgery is not necessary.\par 1. f/u MRI lumbar spine\par 2. Continue PT\par 3. Robaxin PRN\par 4. Adderall for ADD\par 5. f/u with neurosurgery after MRI

## 2021-12-08 ENCOUNTER — RESULT REVIEW (OUTPATIENT)
Age: 36
End: 2021-12-08

## 2021-12-08 ENCOUNTER — OUTPATIENT (OUTPATIENT)
Dept: OUTPATIENT SERVICES | Facility: HOSPITAL | Age: 36
LOS: 1 days | Discharge: HOME | End: 2021-12-08
Payer: MEDICAID

## 2021-12-08 DIAGNOSIS — M54.50 LOW BACK PAIN, UNSPECIFIED: ICD-10-CM

## 2021-12-08 DIAGNOSIS — M54.16 RADICULOPATHY, LUMBAR REGION: ICD-10-CM

## 2021-12-08 PROCEDURE — 72148 MRI LUMBAR SPINE W/O DYE: CPT | Mod: 26

## 2021-12-13 ENCOUNTER — RX RENEWAL (OUTPATIENT)
Age: 36
End: 2021-12-13

## 2021-12-22 NOTE — ASSESSMENT
[FreeTextEntry1] : Patient is a 35 y/o with Tommy Danlos, Hx of GERD, and Colonic polyps whom was followed for constipation. Patient notes amitiza did not help much and still with constipation. She notes she relies on Miralax daily despite using Amitiza. She tried Motegrity with no help. Continue Miralax. continue pro-biotic. \par \par Continue Miralax\par Try Florastor\par Follow up as needed

## 2021-12-22 NOTE — HISTORY OF PRESENT ILLNESS
[FreeTextEntry4] : Wilda [de-identified] : Patient is a 35 y/o with Tommy Danlos, Hx of GERD, and Colonic polyps whom was followed for constipation. Patient notes amitiza did not help much and still with constipation. She notes she relies on Miralax daily despite using Amitiza. She tried Motegrity with no help.

## 2022-01-24 ENCOUNTER — RX RENEWAL (OUTPATIENT)
Age: 37
End: 2022-01-24

## 2022-02-02 ENCOUNTER — RX RENEWAL (OUTPATIENT)
Age: 37
End: 2022-02-02

## 2022-03-17 ENCOUNTER — APPOINTMENT (OUTPATIENT)
Age: 37
End: 2022-03-17
Payer: MEDICAID

## 2022-03-17 VITALS
SYSTOLIC BLOOD PRESSURE: 100 MMHG | HEART RATE: 74 BPM | OXYGEN SATURATION: 98 % | HEIGHT: 67 IN | WEIGHT: 235 LBS | DIASTOLIC BLOOD PRESSURE: 70 MMHG | BODY MASS INDEX: 36.88 KG/M2

## 2022-03-17 PROCEDURE — 99212 OFFICE O/P EST SF 10 MIN: CPT

## 2022-03-23 ENCOUNTER — APPOINTMENT (OUTPATIENT)
Dept: NEUROLOGY | Facility: CLINIC | Age: 37
End: 2022-03-23

## 2022-06-09 ENCOUNTER — EMERGENCY (EMERGENCY)
Facility: HOSPITAL | Age: 37
LOS: 0 days | Discharge: HOME | End: 2022-06-10
Attending: EMERGENCY MEDICINE | Admitting: EMERGENCY MEDICINE
Payer: MEDICAID

## 2022-06-09 VITALS
RESPIRATION RATE: 16 BRPM | DIASTOLIC BLOOD PRESSURE: 62 MMHG | OXYGEN SATURATION: 99 % | HEART RATE: 88 BPM | SYSTOLIC BLOOD PRESSURE: 98 MMHG

## 2022-06-09 VITALS
HEIGHT: 67 IN | RESPIRATION RATE: 17 BRPM | WEIGHT: 220.02 LBS | DIASTOLIC BLOOD PRESSURE: 66 MMHG | OXYGEN SATURATION: 98 % | HEART RATE: 77 BPM | SYSTOLIC BLOOD PRESSURE: 110 MMHG | TEMPERATURE: 98 F

## 2022-06-09 DIAGNOSIS — K59.00 CONSTIPATION, UNSPECIFIED: ICD-10-CM

## 2022-06-09 DIAGNOSIS — R10.13 EPIGASTRIC PAIN: ICD-10-CM

## 2022-06-09 DIAGNOSIS — Z20.822 CONTACT WITH AND (SUSPECTED) EXPOSURE TO COVID-19: ICD-10-CM

## 2022-06-09 DIAGNOSIS — R79.89 OTHER SPECIFIED ABNORMAL FINDINGS OF BLOOD CHEMISTRY: ICD-10-CM

## 2022-06-09 DIAGNOSIS — F41.9 ANXIETY DISORDER, UNSPECIFIED: ICD-10-CM

## 2022-06-09 LAB
ALBUMIN SERPL ELPH-MCNC: 3.8 G/DL — SIGNIFICANT CHANGE UP (ref 3.5–5.2)
ALP SERPL-CCNC: 144 U/L — HIGH (ref 30–115)
ALT FLD-CCNC: 215 U/L — HIGH (ref 0–41)
ANION GAP SERPL CALC-SCNC: 8 MMOL/L — SIGNIFICANT CHANGE UP (ref 7–14)
APPEARANCE UR: CLEAR — SIGNIFICANT CHANGE UP
AST SERPL-CCNC: 164 U/L — HIGH (ref 0–41)
BASOPHILS # BLD AUTO: 0.03 K/UL — SIGNIFICANT CHANGE UP (ref 0–0.2)
BASOPHILS NFR BLD AUTO: 0.4 % — SIGNIFICANT CHANGE UP (ref 0–1)
BILIRUB SERPL-MCNC: 0.3 MG/DL — SIGNIFICANT CHANGE UP (ref 0.2–1.2)
BILIRUB UR-MCNC: NEGATIVE — SIGNIFICANT CHANGE UP
BUN SERPL-MCNC: 9 MG/DL — LOW (ref 10–20)
CALCIUM SERPL-MCNC: 9 MG/DL — SIGNIFICANT CHANGE UP (ref 8.5–10.1)
CHLORIDE SERPL-SCNC: 107 MMOL/L — SIGNIFICANT CHANGE UP (ref 98–110)
CO2 SERPL-SCNC: 26 MMOL/L — SIGNIFICANT CHANGE UP (ref 17–32)
COLOR SPEC: YELLOW — SIGNIFICANT CHANGE UP
CREAT SERPL-MCNC: 0.8 MG/DL — SIGNIFICANT CHANGE UP (ref 0.7–1.5)
DIFF PNL FLD: NEGATIVE — SIGNIFICANT CHANGE UP
EGFR: 98 ML/MIN/1.73M2 — SIGNIFICANT CHANGE UP
EOSINOPHIL # BLD AUTO: 0.14 K/UL — SIGNIFICANT CHANGE UP (ref 0–0.7)
EOSINOPHIL NFR BLD AUTO: 2.1 % — SIGNIFICANT CHANGE UP (ref 0–8)
GLUCOSE SERPL-MCNC: 82 MG/DL — SIGNIFICANT CHANGE UP (ref 70–99)
GLUCOSE UR QL: NEGATIVE MG/DL — SIGNIFICANT CHANGE UP
HCG SERPL QL: NEGATIVE — SIGNIFICANT CHANGE UP
HCT VFR BLD CALC: 40.3 % — SIGNIFICANT CHANGE UP (ref 37–47)
HGB BLD-MCNC: 12.7 G/DL — SIGNIFICANT CHANGE UP (ref 12–16)
IMM GRANULOCYTES NFR BLD AUTO: 0.3 % — SIGNIFICANT CHANGE UP (ref 0.1–0.3)
KETONES UR-MCNC: NEGATIVE — SIGNIFICANT CHANGE UP
LACTATE SERPL-SCNC: 0.6 MMOL/L — LOW (ref 0.7–2)
LEUKOCYTE ESTERASE UR-ACNC: NEGATIVE — SIGNIFICANT CHANGE UP
LIDOCAIN IGE QN: 31 U/L — SIGNIFICANT CHANGE UP (ref 7–60)
LYMPHOCYTES # BLD AUTO: 0.92 K/UL — LOW (ref 1.2–3.4)
LYMPHOCYTES # BLD AUTO: 13.6 % — LOW (ref 20.5–51.1)
MCHC RBC-ENTMCNC: 26.4 PG — LOW (ref 27–31)
MCHC RBC-ENTMCNC: 31.5 G/DL — LOW (ref 32–37)
MCV RBC AUTO: 83.8 FL — SIGNIFICANT CHANGE UP (ref 81–99)
MONOCYTES # BLD AUTO: 0.5 K/UL — SIGNIFICANT CHANGE UP (ref 0.1–0.6)
MONOCYTES NFR BLD AUTO: 7.4 % — SIGNIFICANT CHANGE UP (ref 1.7–9.3)
NEUTROPHILS # BLD AUTO: 5.14 K/UL — SIGNIFICANT CHANGE UP (ref 1.4–6.5)
NEUTROPHILS NFR BLD AUTO: 76.2 % — HIGH (ref 42.2–75.2)
NITRITE UR-MCNC: NEGATIVE — SIGNIFICANT CHANGE UP
NRBC # BLD: 0 /100 WBCS — SIGNIFICANT CHANGE UP (ref 0–0)
PH UR: 6.5 — SIGNIFICANT CHANGE UP (ref 5–8)
PLATELET # BLD AUTO: 195 K/UL — SIGNIFICANT CHANGE UP (ref 130–400)
POTASSIUM SERPL-MCNC: 4.3 MMOL/L — SIGNIFICANT CHANGE UP (ref 3.5–5)
POTASSIUM SERPL-SCNC: 4.3 MMOL/L — SIGNIFICANT CHANGE UP (ref 3.5–5)
PROT SERPL-MCNC: 6 G/DL — SIGNIFICANT CHANGE UP (ref 6–8)
PROT UR-MCNC: NEGATIVE MG/DL — SIGNIFICANT CHANGE UP
RBC # BLD: 4.81 M/UL — SIGNIFICANT CHANGE UP (ref 4.2–5.4)
RBC # FLD: 13.9 % — SIGNIFICANT CHANGE UP (ref 11.5–14.5)
SARS-COV-2 RNA SPEC QL NAA+PROBE: SIGNIFICANT CHANGE UP
SODIUM SERPL-SCNC: 141 MMOL/L — SIGNIFICANT CHANGE UP (ref 135–146)
SP GR SPEC: 1.02 — SIGNIFICANT CHANGE UP (ref 1.01–1.03)
UROBILINOGEN FLD QL: 0.2 MG/DL — SIGNIFICANT CHANGE UP
WBC # BLD: 6.75 K/UL — SIGNIFICANT CHANGE UP (ref 4.8–10.8)
WBC # FLD AUTO: 6.75 K/UL — SIGNIFICANT CHANGE UP (ref 4.8–10.8)

## 2022-06-09 PROCEDURE — 74176 CT ABD & PELVIS W/O CONTRAST: CPT | Mod: 26,MA

## 2022-06-09 PROCEDURE — 76705 ECHO EXAM OF ABDOMEN: CPT | Mod: 26

## 2022-06-09 PROCEDURE — 71045 X-RAY EXAM CHEST 1 VIEW: CPT | Mod: 26

## 2022-06-09 PROCEDURE — 99285 EMERGENCY DEPT VISIT HI MDM: CPT

## 2022-06-09 RX ORDER — FAMOTIDINE 10 MG/ML
1 INJECTION INTRAVENOUS
Qty: 10 | Refills: 0
Start: 2022-06-09 | End: 2022-06-18

## 2022-06-09 RX ORDER — FAMOTIDINE 10 MG/ML
20 INJECTION INTRAVENOUS ONCE
Refills: 0 | Status: COMPLETED | OUTPATIENT
Start: 2022-06-09 | End: 2022-06-09

## 2022-06-09 RX ORDER — MORPHINE SULFATE 50 MG/1
4 CAPSULE, EXTENDED RELEASE ORAL ONCE
Refills: 0 | Status: DISCONTINUED | OUTPATIENT
Start: 2022-06-09 | End: 2022-06-09

## 2022-06-09 RX ORDER — PANTOPRAZOLE SODIUM 20 MG/1
1 TABLET, DELAYED RELEASE ORAL
Qty: 30 | Refills: 0
Start: 2022-06-09 | End: 2022-07-08

## 2022-06-09 RX ORDER — SODIUM CHLORIDE 9 MG/ML
1000 INJECTION INTRAMUSCULAR; INTRAVENOUS; SUBCUTANEOUS ONCE
Refills: 0 | Status: COMPLETED | OUTPATIENT
Start: 2022-06-09 | End: 2022-06-09

## 2022-06-09 RX ORDER — ONDANSETRON 8 MG/1
4 TABLET, FILM COATED ORAL ONCE
Refills: 0 | Status: COMPLETED | OUTPATIENT
Start: 2022-06-09 | End: 2022-06-09

## 2022-06-09 RX ADMIN — SODIUM CHLORIDE 1000 MILLILITER(S): 9 INJECTION INTRAMUSCULAR; INTRAVENOUS; SUBCUTANEOUS at 20:47

## 2022-06-09 RX ADMIN — MORPHINE SULFATE 4 MILLIGRAM(S): 50 CAPSULE, EXTENDED RELEASE ORAL at 20:42

## 2022-06-09 RX ADMIN — SODIUM CHLORIDE 1000 MILLILITER(S): 9 INJECTION INTRAMUSCULAR; INTRAVENOUS; SUBCUTANEOUS at 19:44

## 2022-06-09 RX ADMIN — FAMOTIDINE 20 MILLIGRAM(S): 10 INJECTION INTRAVENOUS at 19:48

## 2022-06-09 RX ADMIN — MORPHINE SULFATE 4 MILLIGRAM(S): 50 CAPSULE, EXTENDED RELEASE ORAL at 22:30

## 2022-06-09 RX ADMIN — ONDANSETRON 4 MILLIGRAM(S): 8 TABLET, FILM COATED ORAL at 19:34

## 2022-06-09 NOTE — ED PROVIDER NOTE - PATIENT PORTAL LINK FT
You can access the FollowMyHealth Patient Portal offered by [facility] by registering at the following website: [email or web address]. By joining [facility]’s FollowMyHealth portal, you will also be able to view your health information using other applications (apps) compatible with our system.

## 2022-06-09 NOTE — ED PROVIDER NOTE - CARE PLAN
Principal Discharge DX:	Abdominal pain   1 Principal Discharge DX:	Abdominal pain  Secondary Diagnosis:	Elevated LFTs

## 2022-06-09 NOTE — ED PROVIDER NOTE - OBJECTIVE STATEMENT
37-year-old female past medical history anxiety, constipation (follows with Dr. Schaffer, GI) presenting to the ED for evaluation of abdominal pain x 3 days.  Patient reports pain is intermittent describes pain to be sore/dull, worsened after eating.  Patient spoke to her friend who told her to take Gas-X and Pepcid, with minimal relief.  Last bowel movement today.  Denies fever, chills, vomiting, chest pain, shortness of breath, dysuria, hematuria.

## 2022-06-09 NOTE — ED PROVIDER NOTE - NSFOLLOWUPINSTRUCTIONS_ED_ALL_ED_FT
Our Emergency Department Referral Coordinators will be reaching out to you in the next 24-48 hours from 9:00am to 5:00pm (Monday - Friday) with a follow up appointment. Please expect a phone call from the hospital in that time frame. If you do not receive a call or if you have any questions or concerns, you can reach them at [phone] or [phone].      Abdominal Pain    Many things can cause abdominal pain. Usually, abdominal pain is not caused by a disease and will improve without treatment. Your health care provider will do a physical exam and possibly order blood tests and imaging to help determine the seriousness of your pain. However, in many cases, no cause may be found and you may need further testing as an outpatient. Monitor your abdominal pain for any changes.     SEEK IMMEDIATE MEDICAL CARE IF YOU HAVE THE FOLLOWING SYMPTOMS: worsening abdominal pain, vomiting, diarrhea, inability to have bowel movements or pass gas, black or bloody stool, fever accompanying chest pain or back pain, or dizziness/lightheadedness.

## 2022-06-09 NOTE — ED PROVIDER NOTE - ATTENDING APP SHARED VISIT CONTRIBUTION OF CARE
37-year-old female history of gastric balloon anxiety evaluation of abdominal pain.  Patient went 3 days abdominal pain which is in the epigastric region worse after eating patient has tried Gas-X Pepcid without any relief.  Patient however has no fever chills urinary symptoms.  Agree with above exam  Impression  Patient here with abdominal pain here patient afebrile labs with no elevated white count elevated LFTs noted and discussed with patient however normal bilirubin.  CT on pelvis as well as upper quadrant ultrasound within normal limits.  Will discharge on antiacid medication and GI follow-up.  Patient also given Bentyl as the patient states antispasmodics have helped in past.

## 2022-06-09 NOTE — ED PROVIDER NOTE - PHYSICAL EXAMINATION
GENERAL: Well-nourished, Well-developed. NAD.  HEAD: No visible or palpable bumps or hematomas. No ecchymosis behind ears B/L.  Eyes: PERRLA, EOMI. No asymmetry. No nystagmus. No conjunctival injection. Non-icteric sclera.  ENMT: MMM.   Neck: Supple. FROM  CVS: No JVD. No reproducible chest wall tenderness. Normal S1,S2. No murmurs appreciated on auscultation   RESP: No use of accessory muscles. Chest rise symmetrical with good expansion. Lungs clear to auscultation B/L. No wheezing, rales, or rhonchi auscultated.  GI: Normal auscultation of bowel sounds in all 4 quadrants. (+)Mild epigastric ttp. Soft, Nondistended. No guarding or rebound tenderness. No CVAT B/L.  Skin: Warm, Dry. No rashes or lesions. Good cap refill < 2 sec B/L.  EXT: Radial and pedal pulses present B/L. No calf tenderness or swelling B/L. No palpable cords. No pedal edema B/L.

## 2022-06-09 NOTE — ED PROVIDER NOTE - NS ED ROS FT
Constitutional: (-) fever (-) malaise (-) diaphoresis (-) chills   Eyes: (-) visual changes (-) eye pain (-) eye discharge (-) photophobia (-) FB sensation  Cardiac: (-) chest pain  (-) palpitations (-) syncope (-) edema  Respiratory: (-) cough (-) SOB (-) GUERRA  GI: (+) nausea (-) vomiting (-) diarrhea (+) abdominal pain  : (-) dysuria (-) increased frequency  (-) hematuria (-) incontinence  MS: (-) back pain (-) myalgia (-) muscle weakness (-)  joint pain  Neuro: (-) headache (-) dizziness (-) numbness/tingling to extremities B/L (-) weakness  Skin: (-) rash (-) laceration    Except as documented in the HPI, all other systems are negative.

## 2022-06-11 LAB
CULTURE RESULTS: SIGNIFICANT CHANGE UP
SPECIMEN SOURCE: SIGNIFICANT CHANGE UP

## 2022-06-29 ENCOUNTER — APPOINTMENT (OUTPATIENT)
Dept: NEUROLOGY | Facility: CLINIC | Age: 37
End: 2022-06-29

## 2022-06-29 ENCOUNTER — RX RENEWAL (OUTPATIENT)
Age: 37
End: 2022-06-29

## 2022-06-29 VITALS
WEIGHT: 216 LBS | DIASTOLIC BLOOD PRESSURE: 71 MMHG | SYSTOLIC BLOOD PRESSURE: 100 MMHG | HEART RATE: 86 BPM | HEIGHT: 67 IN | OXYGEN SATURATION: 98 % | BODY MASS INDEX: 33.9 KG/M2

## 2022-06-29 DIAGNOSIS — M48.9 SPONDYLOPATHY, UNSPECIFIED: ICD-10-CM

## 2022-06-29 PROCEDURE — 99213 OFFICE O/P EST LOW 20 MIN: CPT

## 2022-06-29 NOTE — HISTORY OF PRESENT ILLNESS
[FreeTextEntry1] : Ms. Deshpande is a 36yo woman being seen for follow up of her lower back pain, neck pain and migraines.  She has had increasing lower back pain over the last few months and her neck pain has improved.  \par She saw neurosurgery and was not deemed a candidate for surgery.  She would like surgery because she is afraid as she is getting older she will then need surgery and it will be harder for her.  Since her pain has improved then surgery is not necessary.\par \par The emgality has helped significantly with migraines and they have been reduced to almost none to 1 per month.\par \par She is dealing with GI issues now and had a transient episode of elevated liver enzymes.  She has been getting severe abdominal pain and saw GI doctor this morning.  She saw pulmonary for what she believes is post covid breathing issues\par She has lost about 60 pounds in last 6 months \par

## 2022-06-29 NOTE — DISCUSSION/SUMMARY
[FreeTextEntry1] : Ms. Deshpande is a 38yo woman being seen for follow up of her lower back pain, neck pain and migraines.  She has had increasing lower back pain over the last few months and her neck pain has improved.  \par She saw neurosurgery and was not deemed a candidate for surgery.  She would like surgery because she is afraid as she is getting older she will then need surgery and it will be harder for her.  Since her pain has improved then surgery is not necessary.\par 1. continue core strengthening\par 2. f/u with GI\par 3. Continue emgality, Adderall, diazepam\par 4. f/u in 6 months\par

## 2022-07-13 ENCOUNTER — APPOINTMENT (OUTPATIENT)
Dept: CARDIOLOGY | Facility: CLINIC | Age: 37
End: 2022-07-13

## 2022-07-13 PROCEDURE — 93306 TTE W/DOPPLER COMPLETE: CPT

## 2022-08-26 ENCOUNTER — OUTPATIENT (OUTPATIENT)
Dept: OUTPATIENT SERVICES | Facility: HOSPITAL | Age: 37
LOS: 1 days | Discharge: HOME | End: 2022-08-26

## 2022-08-26 DIAGNOSIS — R92.2 INCONCLUSIVE MAMMOGRAM: ICD-10-CM

## 2022-08-26 DIAGNOSIS — N63.20 UNSPECIFIED LUMP IN THE LEFT BREAST, UNSPECIFIED QUADRANT: ICD-10-CM

## 2022-08-26 PROCEDURE — 76641 ULTRASOUND BREAST COMPLETE: CPT | Mod: 26,50

## 2022-08-26 PROCEDURE — 77066 DX MAMMO INCL CAD BI: CPT | Mod: 26

## 2022-08-26 PROCEDURE — 77062 BREAST TOMOSYNTHESIS BI: CPT | Mod: 26

## 2022-08-29 ENCOUNTER — APPOINTMENT (OUTPATIENT)
Dept: CARDIOLOGY | Facility: CLINIC | Age: 37
End: 2022-08-29

## 2022-09-13 ENCOUNTER — APPOINTMENT (OUTPATIENT)
Dept: CARDIOLOGY | Facility: CLINIC | Age: 37
End: 2022-09-13

## 2022-09-13 VITALS
BODY MASS INDEX: 34.21 KG/M2 | DIASTOLIC BLOOD PRESSURE: 78 MMHG | OXYGEN SATURATION: 95 % | TEMPERATURE: 98.1 F | SYSTOLIC BLOOD PRESSURE: 110 MMHG | WEIGHT: 218 LBS | HEIGHT: 67 IN | HEART RATE: 71 BPM

## 2022-09-13 DIAGNOSIS — I25.10 ATHEROSCLEROTIC HEART DISEASE OF NATIVE CORONARY ARTERY W/OUT ANGINA PECTORIS: ICD-10-CM

## 2022-09-13 PROCEDURE — 99214 OFFICE O/P EST MOD 30 MIN: CPT | Mod: 25

## 2022-09-13 PROCEDURE — 93000 ELECTROCARDIOGRAM COMPLETE: CPT

## 2022-09-13 RX ORDER — METHOCARBAMOL 750 MG/1
750 TABLET, FILM COATED ORAL TWICE DAILY
Qty: 60 | Refills: 5 | Status: DISCONTINUED | COMMUNITY
Start: 2020-02-05 | End: 2022-09-13

## 2022-09-13 RX ORDER — TAZAROTENE 1 MG/G
0.1 CREAM TOPICAL
Qty: 60 | Refills: 0 | Status: DISCONTINUED | COMMUNITY
Start: 2021-01-18 | End: 2022-09-13

## 2022-09-13 RX ORDER — METOCLOPRAMIDE 5 MG/1
5 TABLET ORAL
Qty: 10 | Refills: 0 | Status: DISCONTINUED | COMMUNITY
Start: 2019-09-21 | End: 2022-09-13

## 2022-09-13 RX ORDER — MINOCYCLINE HYDROCHLORIDE 75 MG/1
75 TABLET ORAL
Qty: 60 | Refills: 0 | Status: DISCONTINUED | COMMUNITY
Start: 2021-01-18 | End: 2022-09-13

## 2022-09-13 RX ORDER — IPRATROPIUM BROMIDE AND ALBUTEROL SULFATE 2.5; .5 MG/3ML; MG/3ML
0.5-2.5 (3) SOLUTION RESPIRATORY (INHALATION)
Qty: 90 | Refills: 0 | Status: DISCONTINUED | COMMUNITY
Start: 2021-02-08 | End: 2022-09-13

## 2022-09-13 NOTE — HISTORY OF PRESENT ILLNESS
[FreeTextEntry1] : 37 year old female who came in for evaluation of Ehler-Danlos syndrome. She complains of chest pains on left side of sternum on deep inspiration lasting up to 30 min.  She rarely gets palpitations.  She still complains of shortness of breath.  The patient is insistent on being worked up for Ehler-Danlos Syndrome.\par \par \par PMHx:\par \par Tommy-Danlos Syndrome - stable\par Palpitations - improved\par Anxiety - stable\par Reactive hypoglycemia - stable\par Migrane headaches - stable\par COVID-19 - stable\par Shortness of breath - stable\par Chest pain - recurrent\par \par Medications reviewed and reconciled with patient.  Patient is compliant with taking appropriate medications at appropriate doses at appropriate intervals without missing doses.\par

## 2022-09-13 NOTE — REASON FOR VISIT
[Symptom and Test Evaluation] : symptom and test evaluation [Carotid, Aortic and Peripheral Vascular Disease] : carotid, aortic and peripheral vascular disease [Follow-Up - Clinic] : a clinic follow-up of [Palpitations] : palpitations

## 2022-09-13 NOTE — ASSESSMENT
[FreeTextEntry1] : Tommy-Danlos Syndrome - stable\par Palpitations - improved\par Anxiety - stable\par Reactive hypoglycemia - stable\par Migrane headaches - stable\par COVID-19 - stable\par Shortness of breath - stable\par Chest pain - recurrent

## 2022-09-13 NOTE — DISCUSSION/SUMMARY
[With Me] : with me [FreeTextEntry3] : after testing [FreeTextEntry1] : Last visit note reviewed.\par Recent lab results were reviewed.(including  CBC, BMP, LFTs, Lipid Profile, HgbA1c, CRP, UA, Urine Microalbumin, Mg, Ca, TSH, T3, T4).\par Echocardiogram results were reviewed.\par Ekg was performed and interpreted. (sinus rhythm with no abnormalities).\par \par Coronary CTA with Ca score - chest pains - Iveth\par BMP\par Metoprolol 25 mg the night prior to and 25 mg the morning of the CTA \par CT of thorax and abdomen and pelvis with/without contrast - Iveth\par f/u after testing\par \par Number/complexity of problems - Mod - 2 or more chronic stable illnesses\par Amount/complexity of data -history, exam, reviewed old note, labs (see above), reviewed, ekg reviewed, Echo reviewed\par Risk of complications - Mod\par \par \par

## 2022-09-23 ENCOUNTER — EMERGENCY (EMERGENCY)
Facility: HOSPITAL | Age: 37
LOS: 0 days | Discharge: HOME | End: 2022-09-24
Attending: EMERGENCY MEDICINE | Admitting: EMERGENCY MEDICINE

## 2022-09-23 VITALS
OXYGEN SATURATION: 97 % | HEART RATE: 80 BPM | TEMPERATURE: 98 F | RESPIRATION RATE: 18 BRPM | SYSTOLIC BLOOD PRESSURE: 106 MMHG | DIASTOLIC BLOOD PRESSURE: 66 MMHG

## 2022-09-23 DIAGNOSIS — F41.8 OTHER SPECIFIED ANXIETY DISORDERS: ICD-10-CM

## 2022-09-23 DIAGNOSIS — Z86.39 PERSONAL HISTORY OF OTHER ENDOCRINE, NUTRITIONAL AND METABOLIC DISEASE: ICD-10-CM

## 2022-09-23 DIAGNOSIS — G43.909 MIGRAINE, UNSPECIFIED, NOT INTRACTABLE, WITHOUT STATUS MIGRAINOSUS: ICD-10-CM

## 2022-09-23 DIAGNOSIS — F32.81 PREMENSTRUAL DYSPHORIC DISORDER: ICD-10-CM

## 2022-09-23 DIAGNOSIS — F32.A DEPRESSION, UNSPECIFIED: ICD-10-CM

## 2022-09-23 DIAGNOSIS — G47.00 INSOMNIA, UNSPECIFIED: ICD-10-CM

## 2022-09-23 LAB
ALBUMIN SERPL ELPH-MCNC: 4.3 G/DL — SIGNIFICANT CHANGE UP (ref 3.5–5.2)
ALP SERPL-CCNC: 90 U/L — SIGNIFICANT CHANGE UP (ref 30–115)
ALT FLD-CCNC: 31 U/L — SIGNIFICANT CHANGE UP (ref 0–41)
ANION GAP SERPL CALC-SCNC: 11 MMOL/L — SIGNIFICANT CHANGE UP (ref 7–14)
AST SERPL-CCNC: 40 U/L — SIGNIFICANT CHANGE UP (ref 0–41)
BASOPHILS # BLD AUTO: 0.06 K/UL — SIGNIFICANT CHANGE UP (ref 0–0.2)
BASOPHILS NFR BLD AUTO: 0.7 % — SIGNIFICANT CHANGE UP (ref 0–1)
BILIRUB SERPL-MCNC: 0.2 MG/DL — SIGNIFICANT CHANGE UP (ref 0.2–1.2)
BUN SERPL-MCNC: 7 MG/DL — LOW (ref 10–20)
CALCIUM SERPL-MCNC: 8.9 MG/DL — SIGNIFICANT CHANGE UP (ref 8.4–10.5)
CHLORIDE SERPL-SCNC: 99 MMOL/L — SIGNIFICANT CHANGE UP (ref 98–110)
CO2 SERPL-SCNC: 24 MMOL/L — SIGNIFICANT CHANGE UP (ref 17–32)
CREAT SERPL-MCNC: 0.8 MG/DL — SIGNIFICANT CHANGE UP (ref 0.7–1.5)
EGFR: 98 ML/MIN/1.73M2 — SIGNIFICANT CHANGE UP
EOSINOPHIL # BLD AUTO: 0.26 K/UL — SIGNIFICANT CHANGE UP (ref 0–0.7)
EOSINOPHIL NFR BLD AUTO: 2.9 % — SIGNIFICANT CHANGE UP (ref 0–8)
ETHANOL SERPL-MCNC: <10 MG/DL — SIGNIFICANT CHANGE UP
GLUCOSE SERPL-MCNC: 85 MG/DL — SIGNIFICANT CHANGE UP (ref 70–99)
HCG SERPL QL: NEGATIVE — SIGNIFICANT CHANGE UP
HCT VFR BLD CALC: 38.4 % — SIGNIFICANT CHANGE UP (ref 37–47)
HGB BLD-MCNC: 12.7 G/DL — SIGNIFICANT CHANGE UP (ref 12–16)
IMM GRANULOCYTES NFR BLD AUTO: 0.1 % — SIGNIFICANT CHANGE UP (ref 0.1–0.3)
LYMPHOCYTES # BLD AUTO: 2.14 K/UL — SIGNIFICANT CHANGE UP (ref 1.2–3.4)
LYMPHOCYTES # BLD AUTO: 24.2 % — SIGNIFICANT CHANGE UP (ref 20.5–51.1)
MCHC RBC-ENTMCNC: 27.1 PG — SIGNIFICANT CHANGE UP (ref 27–31)
MCHC RBC-ENTMCNC: 33.1 G/DL — SIGNIFICANT CHANGE UP (ref 32–37)
MCV RBC AUTO: 82.1 FL — SIGNIFICANT CHANGE UP (ref 81–99)
MONOCYTES # BLD AUTO: 0.68 K/UL — HIGH (ref 0.1–0.6)
MONOCYTES NFR BLD AUTO: 7.7 % — SIGNIFICANT CHANGE UP (ref 1.7–9.3)
NEUTROPHILS # BLD AUTO: 5.71 K/UL — SIGNIFICANT CHANGE UP (ref 1.4–6.5)
NEUTROPHILS NFR BLD AUTO: 64.4 % — SIGNIFICANT CHANGE UP (ref 42.2–75.2)
NRBC # BLD: 0 /100 WBCS — SIGNIFICANT CHANGE UP (ref 0–0)
PLATELET # BLD AUTO: 245 K/UL — SIGNIFICANT CHANGE UP (ref 130–400)
POTASSIUM SERPL-MCNC: 3.7 MMOL/L — SIGNIFICANT CHANGE UP (ref 3.5–5)
POTASSIUM SERPL-SCNC: 3.7 MMOL/L — SIGNIFICANT CHANGE UP (ref 3.5–5)
PROT SERPL-MCNC: 6.5 G/DL — SIGNIFICANT CHANGE UP (ref 6–8)
RBC # BLD: 4.68 M/UL — SIGNIFICANT CHANGE UP (ref 4.2–5.4)
RBC # FLD: 12.7 % — SIGNIFICANT CHANGE UP (ref 11.5–14.5)
SODIUM SERPL-SCNC: 134 MMOL/L — LOW (ref 135–146)
WBC # BLD: 8.86 K/UL — SIGNIFICANT CHANGE UP (ref 4.8–10.8)
WBC # FLD AUTO: 8.86 K/UL — SIGNIFICANT CHANGE UP (ref 4.8–10.8)

## 2022-09-23 PROCEDURE — 99284 EMERGENCY DEPT VISIT MOD MDM: CPT

## 2022-09-23 RX ORDER — ACETAMINOPHEN 500 MG
650 TABLET ORAL ONCE
Refills: 0 | Status: COMPLETED | OUTPATIENT
Start: 2022-09-23 | End: 2022-09-23

## 2022-09-23 RX ADMIN — Medication 650 MILLIGRAM(S): at 21:27

## 2022-09-23 NOTE — ED ADULT TRIAGE NOTE - SOURCE OF INFORMATION
Problem: Pain - Standard  Goal: Alleviation of pain or a reduction in pain to the patient’s comfort goal  Outcome: Progressing     Problem: Altered Physiologic Condition  Goal: Patient physiologically stable as evidenced by normal lochia, palpable uterine involution and vitals within normal limits  Outcome: Progressing     Problem: Respiratory/Oxygenation Function Post-Surgical  Goal: Patient will achieve/maintain normal respiratory rate/effort  Outcome: Progressing     The patient is Watcher - Medium risk of patient condition declining or worsening    Shift Goals  Clinical Goals: Pain control, adequate I/O, lochia wdl    Progress made toward(s) clinical / shift goals:  Pain well controlled at this time with scheduled medications and prn meds for breakthrough pain. Able to ambulate and reposition self without increased pain. Lochia remains light without clots expressed, pt educated on glenn care and pad changes. Duramorph protocol complete, no s/s respiratory depression while on duramorph, maintained RA sats >90%.    Patient is not progressing towards the following goals: NA       Patient/EMS

## 2022-09-23 NOTE — ED PROVIDER NOTE - PATIENT PORTAL LINK FT
You can access the FollowMyHealth Patient Portal offered by [facility] by registering at the following website: [email or web address]. By joining [facility]’s FollowMyHealth portal, you will also be able to view your health information using other applications (apps) compatible with our system.

## 2022-09-23 NOTE — ED ADULT NURSE REASSESSMENT NOTE - NS ED NURSE REASSESS COMMENT FT1
efraín pacheco [phone] efraín pacheco [phone] took pall of patients belongs home, clothes, purse, phone, jewelry, as per patients request

## 2022-09-23 NOTE — ED PROVIDER NOTE - ATTENDING APP SHARED VISIT CONTRIBUTION OF CARE
I was present for and supervised the key and critical aspects of the procedures performed during the care of the patient. Veronica patient is a 37-year-old female presents for evaluation of worsening depression potentially ordering concerning statements to her parents parents called 911 for further evaluation the patient denies any homicidal suicidal statements states that there was a misunderstanding between which she said and what her mother heard denies any medical symptoms no headache visual changes chest pain shortness of breath abdominal pain back pain or extremity pain patient denies any alcohol or drug use no coingestions    On physical exam normocephalic/atraumatic pupils equal round reactive light accommodation extraocular muscles intact oropharynx clear chest clear to auscultation bilaterally abdomen soft nontender nondistended bowel sounds positive extremities full range of motion no focal deficits noted    Assessment plan patient is well-appearing nontoxic AAOx3 denies any homicidal or suicidal ideations nevertheless given the history we consulted telepsychiatry who deemed patient safe for discharge at this time I was present for and supervised the key and critical aspects of the procedures performed during the care of the patient. Veronica patient is a 37-year-old female presents for evaluation of worsening depression potentially ordering concerning statements to her parents parents called 911 for further evaluation the patient denies any homicidal suicidal statements states that there was a misunderstanding between which she said and what her mother heard denies any medical symptoms no headache visual changes chest pain shortness of breath abdominal pain back pain or extremity pain patient denies any alcohol or drug use no coingestions    On physical exam normocephalic/atraumatic pupils equal round reactive light accommodation extraocular muscles intact oropharynx clear chest clear to auscultation bilaterally abdomen soft nontender nondistended bowel sounds positive extremities full range of motion no focal deficits noted    Assessment plan patient is well-appearing nontoxic AAOx3 denies any homicidal or suicidal ideations nevertheless given the history we consulted telepsychiatry who deemed patient safe for discharge at this time.

## 2022-09-23 NOTE — ED PROVIDER NOTE - OBJECTIVE STATEMENT
Patient BIBA fro depression, Parents called 911, do notlive with patient , Patient sot sure what she said to parents, Denies feels suicidal at this time,

## 2022-09-23 NOTE — ED PROVIDER NOTE - PROGRESS NOTE DETAILS
discussed the case with GI who reviewed results and findings on labs as well as us and ct    advised to admit patient to [facility] for further management.

## 2022-09-23 NOTE — ED PROVIDER NOTE - CLINICAL SUMMARY MEDICAL DECISION MAKING FREE TEXT BOX
we have consulted GI and discussed results with GI advised to admit patient to [facility] in preparation for further management and testing  patient is hemodynamically stable at this time.  I will admit for  further workup patient is well-appearing nontoxic AAOx3 denies any homicidal or suicidal ideations nevertheless given the history we consulted telepsychiatry who deemed patient safe for discharge at this time

## 2022-09-23 NOTE — ED PROVIDER NOTE - NSFOLLOWUPINSTRUCTIONS_ED_ALL_ED_FT
SEE YOUR DOCTOR IN THE NEXT 24-48 HOURS   RETURN FOR ANY FURTHER CONCERNS     TAKE MEDICATION AS PRESCRIBED

## 2022-09-23 NOTE — ED ADULT TRIAGE NOTE - CHIEF COMPLAINT QUOTE
BIBA,, per patient she has been having increasing anxiety and feeling overwhelmed and stress with work, currently denies suicidal ideations in triage, patient states, "My parents overreacted and called 911 because they thought I was having suicidal thoughts" patient denies suicidal thoughts or plan

## 2022-09-23 NOTE — ED PROVIDER NOTE - CARE PLAN
Principal Discharge DX:	Pancreatic mass  Secondary Diagnosis:	Jaundice   1 Principal Discharge DX:	Anxiety and depression

## 2022-09-24 VITALS
HEART RATE: 75 BPM | RESPIRATION RATE: 18 BRPM | SYSTOLIC BLOOD PRESSURE: 94 MMHG | OXYGEN SATURATION: 98 % | TEMPERATURE: 99 F | DIASTOLIC BLOOD PRESSURE: 58 MMHG

## 2022-09-24 DIAGNOSIS — F32.81 PREMENSTRUAL DYSPHORIC DISORDER: ICD-10-CM

## 2022-09-24 PROCEDURE — 90792 PSYCH DIAG EVAL W/MED SRVCS: CPT | Mod: 95

## 2022-09-24 RX ORDER — OXYCODONE AND ACETAMINOPHEN 5; 325 MG/1; MG/1
1 TABLET ORAL
Qty: 8 | Refills: 0
Start: 2022-09-24 | End: 2022-09-25

## 2022-09-24 NOTE — ED BEHAVIORAL HEALTH ASSESSMENT NOTE - HPI (INCLUDE ILLNESS QUALITY, SEVERITY, DURATION, TIMING, CONTEXT, MODIFYING FACTORS, ASSOCIATED SIGNS AND SYMPTOMS)
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 Patient feels there was a misunderstanding and states that she does not want to die. She took 3 valium today (10mg at 8am, 12pm and 5pm) and had half a can of light beer when she got home. She also notes that she suffers from mood instability around menses and recently started cycle. Endorses compliance to other medication, Fluoxetine 80mg daily, Lithium 300mg daily PRN, vistaril and melatonin for insomnia. Three weeks ago, was trialed on buspar 5mg BID and feels like she was much more irritable following initiation. Notes one prior inpatient admission at age 19. Denies currently or prior suicidal ideation, intent or plan. Denies access to guns, weapons. Denies self injurious behaviors. Has help at home caring for grandfather.

## 2022-09-24 NOTE — ED BEHAVIORAL HEALTH ASSESSMENT NOTE - RISK ASSESSMENT
Protective factors for suicide include lack of prior attempt, lack of suicidal ideation, lack of fhx of SA, lack of access to firearms, pet. Risk factors include hx of BPD, mood disorder. Low Acute Suicide Risk

## 2022-09-24 NOTE — ED BEHAVIORAL HEALTH ASSESSMENT NOTE - BEHAVIOR
Implemented All Universal Safety Interventions:  [place] to call system. Call bell, personal items and telephone within reach. Instruct patient to call for assistance. Room bathroom lighting operational. Non-slip footwear when patient is off stretcher. Physically safe environment: no spills, clutter or unnecessary equipment. Stretcher in lowest position, wheels locked, appropriate side rails in place. Cooperative

## 2022-09-24 NOTE — ED BEHAVIORAL HEALTH ASSESSMENT NOTE - NSBHADMITCOUNSEL_PSY_A_CORE
risks and benefits of treatment options/instructions for management, treatment and follow up/importance of adherence to chosen treatment/risk factor reduction

## 2022-09-24 NOTE — ED BEHAVIORAL HEALTH NOTE - BEHAVIORAL HEALTH NOTE
“Collateral (Daina Deshpande, Mother) has requested that the information provided remain confidential: Yes [  ] No [x  ]     Collateral (Daina Deshpande, Mother) has provided information that patient is/may be unaware of: Yes [  ] No [x  ]”             “Patient gives permission to obtain collateral from __Daina Deshpande___:     (  ) Yes     ( x )  No     Rationale for overriding objection               (  ) Lack of capacity. Details: ________               (  x) Assessing risk of danger to self/others. Details: __EMS activated by parents due to pt endorsing passive SI ______            Rationale for selecting specific collateral source               ( x ) Potential to impact risk of danger to self/others and no alternative equivalent. Details: _____”              ========================     FOR EACH COLLATERAL     ========================     NAME:  Daina Deshpande      NUMBER:  [phone]     RELATIONSHIP: Mother     RELIABILITY: Fair      COMMENTS:  Pt’s parents activated EMS due to pt making statements that were concerning.      ========================     PATIENT DEMOGRAPHICS: Patient is a 37F, single, no children, employed as a nurse in a school, and domiciled.      ========================     HPI     BASELINE FUNCTIONING: unknown      DATE HPI STARTED: Per collateral for the last few days pt has been experiencing an increase in depressive symptoms     DECOMPENSATION: Per collateral, pt’s father went over to the home where pt resides; Pt was very depressed, seemed out of it and stated she “took extra medication”. Collateral stated she also endorsed passive SI, unknown intentions and if pt actually did take extra medications. Collateral stated a recent change was pt was prescribed Buspar (unclear mg and timeline). Collateral stated pt was complaining of negative side effects from medication such as jitteriness and has stopped taking the medications. Per collateral, pt has bouts of depression, however prior to starting Buspar she was doing “ok”.       SUICIDALITY: Reports pt was endorsing passive SI, and stating she took extra medications, unclear if pt did take extra.       VIOLENCE: Denies      SUBSTANCE: Denies      ========================     PAST PSYCHIATRIC HISTORY     ========================     DATE PAST PSYCHIATRIC HISTORY STARTED:  “since she was in 6th grade she struggled with anxiety and was in treatment for it”.     MAIN PSYCHIATRIC DIAGNOSIS: “Borderline personality disorder”      PSYCHIATRIC HOSPITALIZATIONS: Per collateral, pt was hospitalized when she was an adolescent for passive SI.      SUICIDALITY: hx of passive SI, unknown hx of SA.     VIOLENCE: Denies      SUBSTANCE USE: Denies      ==============     OTHER HISTORY     ==============     CURRENT MEDICATION: Reports pt is currently prescribed Prozac and Lithium (unknown mg)     MEDICAL HISTORY: unknown      ALLERGIES: unknown      LEGAL ISSUES: Denies      FIREARM ACCESS: Denies      SOCIAL HISTORY: Per collateral, pt has an appointment on Monday with a new therapist via telehealth and pt had expressed feeling optimistic about it.      FAMILY HISTORY: “mostly anxiety”.     DEVELOPMENTAL HISTORY: Denies              ------------------------------------------------     COVID Exposure Screen- collateral (i.e. third-party, chart review, belongings, etc; include EMS and ED staff)      ---------------------------------------------------     1.    Has the patient had a COVID-19 test in the last 90 days? Unknown.      2. Has the patient tested positive for COVID-19 antibodies? Unknown.      3.Has the patient received 2 doses of the COVID-19 vaccine?  Unknown.      4. In the past 10 days, has the patient been around anyone with a positive COVID-19 test?* Unknown.      5.Has the patient been out of [place] within the past 10 days? Unknown.

## 2022-09-24 NOTE — ED BEHAVIORAL HEALTH ASSESSMENT NOTE - CURRENT MEDICATION
Fluoxetine 80mg daily, Hydroxyzine 50mg QHS PRN, Melatonin 6mg QHS, emgality, trulicy, Lithium 300mg daily PRN

## 2022-09-24 NOTE — ED BEHAVIORAL HEALTH ASSESSMENT NOTE - SUMMARY
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 Patient adamantly denies suicidal ideation, intent or plan and denies prior suicide attempts. Patient is able to safety plan and has good outpatient care with both psychiatrist and psychologist. At this time, patient can be T&R given lack of indication for inpatient psychiatric admission at this time.

## 2022-09-24 NOTE — ED BEHAVIORAL HEALTH NOTE - BEHAVIORAL HEALTH NOTE
==================  PRE-HOSPITAL COURSE  ===================  SOURCE:  RN and secondhand ED documentation  DETAILS: 38 yo F BIB EMS, per patient she has been having increasing anxiety and feeling overwhelmed and stress with work, states, "My parents overreacted and called 911 because they thought I was having suicidal thoughts", denies suicidal thoughts or plan.   =========  ED COURSE  =========  SOURCE:  RN [place] and secondhand ED documentation.  ARRIVAL:  Per chart and RN, patient arrived via EMS. Per RN, patient was calm upon arrival, and cooperative with triage process.  BELONGINGS:  Per RN, patient arrived with belongings. All belongings were provided to hospital security, and patient currently in a gown with a 1:1 staff member.  BEHAVIOR: RN described patient to be calm and cooperative, presenting with linear thought process, AAOx3, presenting with normal mood and appropriate affect, remains in good behavioral and impulse control, is not violent/aggressive. RN stated that the patient is denying SI/HI/A/VH. RN stated that there are no visible marks, bruises, or lacerations on the body. RN stated that the patient appears to be well-groomed, maintains good hygiene, and reports goodADLs, ambulates without assistance.  TREATMENT:  Per chart and RN, patient did not require PRN medications.   VISITORS:  Per RN, no visitors at bedside.    COVID Exposure Screen- collateral (i.e. third-party, chart review, belongings, etc; include EMS and ED staff)   ---------------------------------------------------  1. Has the patient had a COVID-19 test in the last 90 days? Unknown.   2. Has the patient tested positive for COVID-19 antibodies? Unknown.   3. Has the patient received 2 doses of the COVID-19 vaccine? Unknown  4. In the past 10 days, has the patient been around anyone with a positive COVID-19 test?* Unknown.   5. Has the patient been out of [place] within the past 10 days? Unknown

## 2022-09-27 ENCOUNTER — RX RENEWAL (OUTPATIENT)
Age: 37
End: 2022-09-27

## 2022-10-10 RX ORDER — FLUOXETINE HYDROCHLORIDE 60 MG/1
60 TABLET ORAL
Qty: 30 | Refills: 5 | Status: ACTIVE | COMMUNITY
Start: 1900-01-01 | End: 1900-01-01

## 2022-12-08 ENCOUNTER — APPOINTMENT (OUTPATIENT)
Age: 37
End: 2022-12-08

## 2022-12-08 VITALS
HEART RATE: 104 BPM | HEIGHT: 67 IN | BODY MASS INDEX: 33.74 KG/M2 | RESPIRATION RATE: 14 BRPM | SYSTOLIC BLOOD PRESSURE: 110 MMHG | DIASTOLIC BLOOD PRESSURE: 78 MMHG | WEIGHT: 215 LBS | OXYGEN SATURATION: 99 %

## 2022-12-08 DIAGNOSIS — M26.609 UNSPECIFIED TEMPOROMANDIBULAR JOINT DISORDER: ICD-10-CM

## 2022-12-08 PROCEDURE — 99213 OFFICE O/P EST LOW 20 MIN: CPT

## 2023-01-30 ENCOUNTER — APPOINTMENT (OUTPATIENT)
Dept: NEUROLOGY | Facility: CLINIC | Age: 38
End: 2023-01-30

## 2023-02-04 ENCOUNTER — EMERGENCY (EMERGENCY)
Facility: HOSPITAL | Age: 38
LOS: 0 days | Discharge: HOME | End: 2023-02-04
Attending: EMERGENCY MEDICINE | Admitting: EMERGENCY MEDICINE
Payer: MEDICAID

## 2023-02-04 VITALS
HEART RATE: 80 BPM | TEMPERATURE: 97 F | SYSTOLIC BLOOD PRESSURE: 126 MMHG | OXYGEN SATURATION: 98 % | RESPIRATION RATE: 18 BRPM | DIASTOLIC BLOOD PRESSURE: 77 MMHG | WEIGHT: 160.06 LBS

## 2023-02-04 DIAGNOSIS — K80.70 CALCULUS OF GALLBLADDER AND BILE DUCT WITHOUT CHOLECYSTITIS WITHOUT OBSTRUCTION: ICD-10-CM

## 2023-02-04 DIAGNOSIS — Z98.84 BARIATRIC SURGERY STATUS: ICD-10-CM

## 2023-02-04 DIAGNOSIS — F41.9 ANXIETY DISORDER, UNSPECIFIED: ICD-10-CM

## 2023-02-04 DIAGNOSIS — R10.11 RIGHT UPPER QUADRANT PAIN: ICD-10-CM

## 2023-02-04 DIAGNOSIS — Z87.19 PERSONAL HISTORY OF OTHER DISEASES OF THE DIGESTIVE SYSTEM: ICD-10-CM

## 2023-02-04 DIAGNOSIS — E16.2 HYPOGLYCEMIA, UNSPECIFIED: ICD-10-CM

## 2023-02-04 DIAGNOSIS — K80.50 CALCULUS OF BILE DUCT WITHOUT CHOLANGITIS OR CHOLECYSTITIS WITHOUT OBSTRUCTION: ICD-10-CM

## 2023-02-04 DIAGNOSIS — R11.10 VOMITING, UNSPECIFIED: ICD-10-CM

## 2023-02-04 LAB
ALBUMIN SERPL ELPH-MCNC: 4 G/DL — SIGNIFICANT CHANGE UP (ref 3.5–5.2)
ALP SERPL-CCNC: 117 U/L — HIGH (ref 30–115)
ALT FLD-CCNC: 109 U/L — HIGH (ref 0–41)
ANION GAP SERPL CALC-SCNC: 12 MMOL/L — SIGNIFICANT CHANGE UP (ref 7–14)
AST SERPL-CCNC: 200 U/L — HIGH (ref 0–41)
BASOPHILS # BLD AUTO: 0.02 K/UL — SIGNIFICANT CHANGE UP (ref 0–0.2)
BASOPHILS NFR BLD AUTO: 0.2 % — SIGNIFICANT CHANGE UP (ref 0–1)
BILIRUB SERPL-MCNC: 0.6 MG/DL — SIGNIFICANT CHANGE UP (ref 0.2–1.2)
BUN SERPL-MCNC: 10 MG/DL — SIGNIFICANT CHANGE UP (ref 10–20)
CALCIUM SERPL-MCNC: 9.1 MG/DL — SIGNIFICANT CHANGE UP (ref 8.4–10.5)
CHLORIDE SERPL-SCNC: 105 MMOL/L — SIGNIFICANT CHANGE UP (ref 98–110)
CHOLEST SERPL-MCNC: 166 MG/DL — SIGNIFICANT CHANGE UP
CO2 SERPL-SCNC: 24 MMOL/L — SIGNIFICANT CHANGE UP (ref 17–32)
CREAT SERPL-MCNC: 0.7 MG/DL — SIGNIFICANT CHANGE UP (ref 0.7–1.5)
EGFR: 116 ML/MIN/1.73M2 — SIGNIFICANT CHANGE UP
EOSINOPHIL # BLD AUTO: 0.09 K/UL — SIGNIFICANT CHANGE UP (ref 0–0.7)
EOSINOPHIL NFR BLD AUTO: 0.9 % — SIGNIFICANT CHANGE UP (ref 0–8)
GLUCOSE SERPL-MCNC: 112 MG/DL — HIGH (ref 70–99)
HCG SERPL QL: NEGATIVE — SIGNIFICANT CHANGE UP
HCT VFR BLD CALC: 35.6 % — LOW (ref 37–47)
HDLC SERPL-MCNC: 67 MG/DL — SIGNIFICANT CHANGE UP
HGB BLD-MCNC: 11.6 G/DL — LOW (ref 12–16)
IMM GRANULOCYTES NFR BLD AUTO: 0.2 % — SIGNIFICANT CHANGE UP (ref 0.1–0.3)
LACTATE SERPL-SCNC: 1.3 MMOL/L — SIGNIFICANT CHANGE UP (ref 0.7–2)
LIDOCAIN IGE QN: 33 U/L — SIGNIFICANT CHANGE UP (ref 7–60)
LIPID PNL WITH DIRECT LDL SERPL: 86 MG/DL — SIGNIFICANT CHANGE UP
LYMPHOCYTES # BLD AUTO: 0.74 K/UL — LOW (ref 1.2–3.4)
LYMPHOCYTES # BLD AUTO: 7.4 % — LOW (ref 20.5–51.1)
MCHC RBC-ENTMCNC: 24.8 PG — LOW (ref 27–31)
MCHC RBC-ENTMCNC: 32.6 G/DL — SIGNIFICANT CHANGE UP (ref 32–37)
MCV RBC AUTO: 76.2 FL — LOW (ref 81–99)
MONOCYTES # BLD AUTO: 0.71 K/UL — HIGH (ref 0.1–0.6)
MONOCYTES NFR BLD AUTO: 7.1 % — SIGNIFICANT CHANGE UP (ref 1.7–9.3)
NEUTROPHILS # BLD AUTO: 8.41 K/UL — HIGH (ref 1.4–6.5)
NEUTROPHILS NFR BLD AUTO: 84.2 % — HIGH (ref 42.2–75.2)
NON HDL CHOLESTEROL: 99 MG/DL — SIGNIFICANT CHANGE UP
NRBC # BLD: 0 /100 WBCS — SIGNIFICANT CHANGE UP (ref 0–0)
PLATELET # BLD AUTO: 215 K/UL — SIGNIFICANT CHANGE UP (ref 130–400)
POTASSIUM SERPL-MCNC: 4.1 MMOL/L — SIGNIFICANT CHANGE UP (ref 3.5–5)
POTASSIUM SERPL-SCNC: 4.1 MMOL/L — SIGNIFICANT CHANGE UP (ref 3.5–5)
PROT SERPL-MCNC: 6.1 G/DL — SIGNIFICANT CHANGE UP (ref 6–8)
RBC # BLD: 4.67 M/UL — SIGNIFICANT CHANGE UP (ref 4.2–5.4)
RBC # FLD: 13.8 % — SIGNIFICANT CHANGE UP (ref 11.5–14.5)
SODIUM SERPL-SCNC: 141 MMOL/L — SIGNIFICANT CHANGE UP (ref 135–146)
TRIGL SERPL-MCNC: 65 MG/DL — SIGNIFICANT CHANGE UP
TROPONIN T SERPL-MCNC: <0.01 NG/ML — SIGNIFICANT CHANGE UP
WBC # BLD: 9.99 K/UL — SIGNIFICANT CHANGE UP (ref 4.8–10.8)
WBC # FLD AUTO: 9.99 K/UL — SIGNIFICANT CHANGE UP (ref 4.8–10.8)

## 2023-02-04 PROCEDURE — 76705 ECHO EXAM OF ABDOMEN: CPT | Mod: 26

## 2023-02-04 PROCEDURE — 99285 EMERGENCY DEPT VISIT HI MDM: CPT

## 2023-02-04 RX ORDER — KETOROLAC TROMETHAMINE 30 MG/ML
15 SYRINGE (ML) INJECTION ONCE
Refills: 0 | Status: DISCONTINUED | OUTPATIENT
Start: 2023-02-04 | End: 2023-02-04

## 2023-02-04 RX ORDER — PANTOPRAZOLE SODIUM 20 MG/1
40 TABLET, DELAYED RELEASE ORAL ONCE
Refills: 0 | Status: COMPLETED | OUTPATIENT
Start: 2023-02-04 | End: 2023-02-04

## 2023-02-04 RX ORDER — SODIUM CHLORIDE 9 MG/ML
1000 INJECTION, SOLUTION INTRAVENOUS ONCE
Refills: 0 | Status: COMPLETED | OUTPATIENT
Start: 2023-02-04 | End: 2023-02-04

## 2023-02-04 RX ORDER — ONDANSETRON 8 MG/1
4 TABLET, FILM COATED ORAL ONCE
Refills: 0 | Status: COMPLETED | OUTPATIENT
Start: 2023-02-04 | End: 2023-02-04

## 2023-02-04 RX ORDER — MORPHINE SULFATE 50 MG/1
4 CAPSULE, EXTENDED RELEASE ORAL ONCE
Refills: 0 | Status: DISCONTINUED | OUTPATIENT
Start: 2023-02-04 | End: 2023-02-04

## 2023-02-04 RX ADMIN — Medication 15 MILLIGRAM(S): at 07:05

## 2023-02-04 RX ADMIN — ONDANSETRON 4 MILLIGRAM(S): 8 TABLET, FILM COATED ORAL at 02:26

## 2023-02-04 RX ADMIN — MORPHINE SULFATE 4 MILLIGRAM(S): 50 CAPSULE, EXTENDED RELEASE ORAL at 04:00

## 2023-02-04 RX ADMIN — SODIUM CHLORIDE 1000 MILLILITER(S): 9 INJECTION, SOLUTION INTRAVENOUS at 02:26

## 2023-02-04 RX ADMIN — PANTOPRAZOLE SODIUM 40 MILLIGRAM(S): 20 TABLET, DELAYED RELEASE ORAL at 02:26

## 2023-02-04 NOTE — ED PROVIDER NOTE - PATIENT PORTAL LINK FT
You can access the FollowMyHealth Patient Portal offered by [facility] by registering at the following website: [email or web address]. By joining [employer] [facility]’s FollowMyHealth portal, you will also be able to view your health information using other applications (apps) compatible with our system.

## 2023-02-04 NOTE — ED PROVIDER NOTE - CARE PROVIDER_API CALL
Ade Lewis)  Surgery  [address] [place] [address]  Phone: [phone]  Fax: [phone]  Follow Up Time: 1-3 Days    Fatimah Davis)  Gastroenterology  [address] [place] [address]  Phone: [phone]  Fax: [phone]  Established Patient  Follow Up Time: 1-3 Days    Tera Overton ()  Infectious Disease; Internal Medicine  [address] [place] [address]  Phone: [phone]  Fax: [phone]  Established Patient  Follow Up Time: 1-3 Days

## 2023-02-04 NOTE — CONSULT NOTE ADULT - SUBJECTIVE AND OBJECTIVE BOX
GENERAL SURGERY CONSULT NOTE    Patient: VIVEK COLEY , 37y ([birth year])Female   MRN: [id number]  Location: [facility] ED  Visit: 02-04-23 Emergency  Date: 02-04-23 @ 05:56    HPI: Patient is a 37 year old female with PMHx of gastric balloon removal 1 year ago with persistent gastritis afterwards, hypoglycemia, and anxiety who presented to the ED with RUQ pain for the past day. Patient states that she started having 10/10 abdominal pain since 2/3 which was not relieved by a PPI. Patient states that she has had these episodes previously after her binge eating and drinking episodes and is aware she has cholelithiasis. Patient has been binge drinking and eating for the past week       PAST MEDICAL & SURGICAL HISTORY:  Anxiety      History of hypoglycemia      No significant past surgical history  gastric balloon          Home Medications:  Atarax 50 mg oral tablet: 1 tab(s) orally 4 times a day (12 Aug 2021 07:19)  Benadryl 25 mg oral capsule: 1 cap(s) orally 3 times a day (09 Jun 2022 23:49)  FLUoxetine: 80 milligram(s) orally once a day (12 Aug 2021 07:19)  Valium 2 mg oral tablet: 1 tab(s) orally 3 times a day (12 Aug 2021 07:19)        VITALS:  T(F): 97.4 (02-04-23 @ 01:31), Max: 97.4 (02-04-23 @ 01:31)  HR: 80 (02-04-23 @ 01:31) (80 - 80)  BP: 126/77 (02-04-23 @ 01:31) (126/77 - 126/77)  RR: 18 (02-04-23 @ 01:31) (18 - 18)  SpO2: 98% (02-04-23 @ 01:31) (98% - 98%)    PHYSICAL EXAM:  General: NAD, AAOx3, calm and cooperative  HEENT: NCAT, MARYJANE, EOMI, Trachea ML, Neck supple  Cardiac: RRR S1, S2, no Murmurs, rubs or gallops  Respiratory: CTAB, normal respiratory effort, breath sounds equal BL, no wheeze, rhonchi or crackles  Abdomen: Soft, non-distended, non-tender, no rebound, no guarding. +BS.  Rectal: Good tone, +stool, no blood, no rachana-anal masses/lesions, no fistulas, fissures, hemorrhoids  Musculoskeletal: Strength 5/5 BL UE/LE, ROM intact, compartments soft  Neuro: Sensation grossly intact and equal throughout, no focal deficits  Vascular: Pulses 2+ throughout, extremities well perfused  Skin: Warm/dry, normal color, no jaundice  Incision/wound: healing well, dressings in place, clean, dry and intact    MEDICATIONS  (STANDING):    MEDICATIONS  (PRN):      LAB/STUDIES:                        11.6   9.99  )-----------( 215      ( 04 Feb 2023 02:10 )             35.6     02-04    141  |  105  |  10  ----------------------------<  112<H>  4.1   |  24  |  0.7    Ca    9.1      04 Feb 2023 02:10    TPro  6.1  /  Alb  4.0  /  TBili  0.6  /  DBili  x   /  AST  200<H>  /  ALT  109<H>  /  AlkPhos  117<H>  02-04      LIVER FUNCTIONS - ( 04 Feb 2023 02:10 )  Alb: 4.0 g/dL / Pro: 6.1 g/dL / ALK PHOS: 117 U/L / ALT: 109 U/L / AST: 200 U/L / GGT: x             CARDIAC MARKERS ( 04 Feb 2023 02:10 )  x     / <0.01 ng/mL / x     / x     / x                  IMAGING:           GENERAL SURGERY CONSULT NOTE    Patient: VIVEK COLEY , 37y ([birth year])Female   MRN: [id number]  Location: [facility] ED  Visit: 02-04-23 Emergency  Date: 02-04-23 @ 05:56    HPI: Patient is a 37 year old female with PMHx of gastric balloon removal 1 year ago with persistent gastritis afterwards, hypoglycemia, and anxiety who presented to the ED with RUQ pain for the past day. Patient states that she started having 10/10 abdominal pain since 2/3 which was not relieved by a PPI. Patient states that she has had these episodes previously after her binge eating and drinking episodes and is aware she has cholelithiasis. Patient has been binge drinking and eating for the past week and stated she has the same symptoms as prior episodes. Patient was explained about option to admit and undergo a laparoscopic cholecystectomy but patient stated she wanted to be discharged home and to find her own surgeon in [place]. Patient denies any fevers, chills, hematemesis, or melena. Patient is passing flatus and having bowel movements.       PAST MEDICAL & SURGICAL HISTORY:  Anxiety      History of hypoglycemia      No significant past surgical history  gastric balloon          Home Medications:  Atarax 50 mg oral tablet: 1 tab(s) orally 4 times a day (12 Aug 2021 07:19)  Benadryl 25 mg oral capsule: 1 cap(s) orally 3 times a day (09 Jun 2022 23:49)  FLUoxetine: 80 milligram(s) orally once a day (12 Aug 2021 07:19)  Valium 2 mg oral tablet: 1 tab(s) orally 3 times a day (12 Aug 2021 07:19)        VITALS:  T(F): 97.4 (02-04-23 @ 01:31), Max: 97.4 (02-04-23 @ 01:31)  HR: 80 (02-04-23 @ 01:31) (80 - 80)  BP: 126/77 (02-04-23 @ 01:31) (126/77 - 126/77)  RR: 18 (02-04-23 @ 01:31) (18 - 18)  SpO2: 98% (02-04-23 @ 01:31) (98% - 98%)    PHYSICAL EXAM:  General: NAD, AAOx3, calm and cooperative  HEENT: NCAT, MARYJANE, EOMI, Trachea ML, Neck supple  Cardiac: RRR S1, S2, no Murmurs, rubs or gallops  Respiratory: CTAB, normal respiratory effort  Abdomen: Soft, non-distended, slightly tender in RUQ. No rebound or guarding.   Vascular: Pulses 2+ throughout, extremities well perfused  Skin: Warm/dry, normal color, no jaundice    MEDICATIONS  (STANDING):    MEDICATIONS  (PRN):      LAB/STUDIES:                        11.6   9.99  )-----------( 215      ( 04 Feb 2023 02:10 )             35.6     02-04    141  |  105  |  10  ----------------------------<  112<H>  4.1   |  24  |  0.7    Ca    9.1      04 Feb 2023 02:10    TPro  6.1  /  Alb  4.0  /  TBili  0.6  /  DBili  x   /  AST  200<H>  /  ALT  109<H>  /  AlkPhos  117<H>  02-04      LIVER FUNCTIONS - ( 04 Feb 2023 02:10 )  Alb: 4.0 g/dL / Pro: 6.1 g/dL / ALK PHOS: 117 U/L / ALT: 109 U/L / AST: 200 U/L / GGT: x             CARDIAC MARKERS ( 04 Feb 2023 02:10 )  x     / <0.01 ng/mL / x     / x     / x              IMAGING:    < from: US Abdomen Upper Quadrant Right (02.04.23 @ 02:53) >  IMPRESSION:  Cholelithiasis without sonographic evidence of acute cholecystitis.    Dilated common bile duct measuring up to 8 mm. Further evaluation with   MRCP is recommended.    < end of copied text >

## 2023-02-04 NOTE — ED PROVIDER NOTE - PROVIDER TOKENS
PROVIDER:[TOKEN:[[id number]],FOLLOWUP:[1-3 Days]],PROVIDER:[TOKEN:[[id number]],FOLLOWUP:[1-3 Days],ESTABLISHEDPATIENT:[T]],PROVIDER:[TOKEN:[[id number]],FOLLOWUP:[1-3 Days],ESTABLISHEDPATIENT:[T]]

## 2023-02-04 NOTE — ED PROVIDER NOTE - OBJECTIVE STATEMENT
37 year old female with pmh of anxiety, gastric balloon removal 1 year ago with persistent gastritis afterwards, hypoglycemia presenting to the ED for RUQ abdominal pain x1 day. A/w NBNB vomiting. Pt states that she is aware that she has cholelithiasis, states is having similar symptoms as previous episodes. Denies any fevers/chills.

## 2023-02-04 NOTE — ED PROVIDER NOTE - NSFOLLOWUPINSTRUCTIONS_ED_ALL_ED_FT
FOLLOW UP WITH SURGERY AND GASTROENTEROLOGIST THIS WEEK FOR REEVALUATION.      **NEED TO REPEAT LIVER FUNCTION TESTS THIS WEEK & RECOMMEND OUTPATIENT MRCP**    RETURN TO ED IMMEDIATELY WITH ANY WORSENING SYMPTOMS, CHEST PAIN, SHORTNESS OF BREATH, ABDOMINAL PAIN, FEVERS, WEAKNESS, DIZZINESS, PERSISTENT OR SEVERE HEADACHE OR ANY OTHER CONCERNS.     Gallstones    Gallstones (cholelithiasis) is a form of gallbladder disease in which stones form in your gallbladder. The gallbladder is an organ that stores bile made in the liver, which helps digest fats. Gallstones begin as small bile crystals and slowly grow into stones. Gallstone pain occurs when the gallbladder spasms and a gallstone or sludge is blocking the duct. Pain can also occur when a stone passes out of the duct. Only take over-the-counter or prescription medicines for pain, discomfort, or fever as directed by your health care provider. Follow a low-fat diet until seen again by your health care provider.     SEEK IMMEDIATE MEDICAL CARE IF YOU HAVE ANY OF THE FOLLOWING SYMPTOMS: worsening pain, fever, persistent vomiting, yellowing of the skin or eyes, or altered mental status.

## 2023-02-04 NOTE — CONSULT NOTE ADULT - ASSESSMENT
ASSESSMENT:  37yF w/ PMHx of  ***  who presented with ***. Physical exam findings, imaging, and labs as documented above.     PLAN:  -  -  -    Lines/Tubes: PIV, Midline, Central Line, A-Line, Chest tubes, Asher/EDI drains, Huertas Catheter.    Above plan discussed with Attending Surgeon  ***  , patient, patient family, and Primary team  02-04-23 @ 05:56    CONSULT SPECTRA: [id number]   ASSESSMENT:  Patient is a 37 year old female with PMHx of gastric balloon removal 1 year ago with persistent gastritis afterwards, hypoglycemia, and anxiety who presented to the ED with RUQ pain for the past day. Patient states that she started having 10/10 abdominal pain since 2/3 which was not relieved by a PPI. Patient states that she has had these episodes previously after her binge eating and drinking episodes and is aware she has cholelithiasis. Patient has been binge drinking and eating for the past week and stated she has the same symptoms as prior episodes. Patient was explained about option to admit and undergo a laparoscopic cholecystectomy but patient stated she wanted to be discharged home and to find her own surgeon in [place]. Patient denies any fevers, chills, hematemesis, or melena. Patient is passing flatus and having bowel movements.     PLAN:  - Patient can be discharged home with follow up with Dr. Lewis or a surgeon of her choice. Patient likely has biliary colic and will benefit from a lap chelsi. Risks and benefits were explained to patient and patient elected not to receive surgery this admission at the present moment. Patient will be following up outpatient with a surgeon for planning for lap chelsi.         Above plan discussed with Attending Surgeon Dr. Lewis , patient, patient family, and Primary team  02-04-23 @ 05:56    CONSULT SPECTRA: [id number]

## 2023-02-04 NOTE — ED PROVIDER NOTE - ATTENDING CONTRIBUTION TO CARE
37-year-old female with PMH anxiety, gastric wound status post removal, gastritis presents for evaluation of right upper quadrant abdominal discomfort 2 hours with associated nonbilious nonbloody vomiting.  Patient denies any fevers or chills, lower abdominal discomfort, melena, hematochezia.  No chest pain, shortness of breath, palpitations or dizziness.  Denies any cough or URI symptoms. Patient reports history of cholelithiasis, has had similar symptoms in the past.    VITAL SIGNS: noted  CONSTITUTIONAL: Well-developed; well-nourished; in no acute distress  HEAD: Normocephalic; atraumatic  EYES: PERRL, EOM intact; conjunctiva and sclera clear  ENT: No nasal discharge; airway clear. MMM  NECK: Supple; non tender. No anterior cervical lymphadenopathy noted  CARD: S1, S2 normal; no murmurs, gallops, or rubs. Regular rate and rhythm  RESP: CTAB/L, no wheezes, rales or rhonchi  ABD: Normal bowel sounds; soft; non-distended; non-tender; no hepatosplenomegaly. No CVA tenderness  EXT: Normal ROM. No calf tenderness or edema. Distal pulses intact  NEURO: Alert, oriented. Grossly unremarkable. No focal deficits  SKIN: Skin exam is warm and dry, no acute rash  MS: No midline spinal tenderness

## 2023-02-04 NOTE — ED PROVIDER NOTE - CLINICAL SUMMARY MEDICAL DECISION MAKING FREE TEXT BOX
Patient evaluated for upper abdominal pain, found to have a dilated CBD with elevated LFTs.  Surgery consulted.  Patient declining surgery at this time preferring to go home.  Patient tolerating p.o. in ED.  Patient cleared by surgery, however strict return precautions advised and need for  close follow-up with surgeon stressed  and pt agreed. Recommended GI and PMD fu as well for reeval and need for repeat LFTs and patient agrees.  Strict return precautions advised and patient verbalized understanding.

## 2023-02-04 NOTE — ED PROVIDER NOTE - PHYSICAL EXAMINATION
CONSTITUTIONAL: Well-developed; well-nourished; in no acute distress.   SKIN: warm, dry; no rashes.  HEAD: Normocephalic; atraumatic.  EYES: PERRLA, EOMI, no conjunctival erythema.  ENT: No nasal discharge; airway clear. MMM.  NECK: Supple; non tender.  CARD: S1, S2 normal; no murmurs, gallops, or rubs. Regular rate and rhythm.   RESP: No wheezes, rales, or rhonchi. Lungs CTAB.  ABD: soft, nd, (+) RUQ ttp. No masses, rebound, or guarding.  EXT: Normal ROM.  No clubbing, cyanosis or edema.  NEURO: Alert, oriented, grossly unremarkable. Nml motor, strength, and sensation. No FND.

## 2023-02-09 ENCOUNTER — APPOINTMENT (OUTPATIENT)
Dept: SURGERY | Facility: CLINIC | Age: 38
End: 2023-02-09
Payer: MEDICAID

## 2023-02-09 VITALS
DIASTOLIC BLOOD PRESSURE: 78 MMHG | SYSTOLIC BLOOD PRESSURE: 116 MMHG | BODY MASS INDEX: 34.53 KG/M2 | WEIGHT: 220 LBS | HEIGHT: 67 IN | TEMPERATURE: 97 F

## 2023-02-09 DIAGNOSIS — K80.10 CALCULUS OF GALLBLADDER WITH CHRONIC CHOLECYSTITIS W/OUT OBSTRUCTION: ICD-10-CM

## 2023-02-09 DIAGNOSIS — K83.8 OTHER SPECIFIED DISEASES OF BILIARY TRACT: ICD-10-CM

## 2023-02-09 DIAGNOSIS — Z80.0 FAMILY HISTORY OF MALIGNANT NEOPLASM OF DIGESTIVE ORGANS: ICD-10-CM

## 2023-02-09 PROCEDURE — 99212 OFFICE O/P EST SF 10 MIN: CPT

## 2023-02-10 PROBLEM — Z80.0 FAMILY HISTORY OF MALIGNANT NEOPLASM OF STOMACH: Status: ACTIVE | Noted: 2023-02-09

## 2023-02-10 PROBLEM — K83.8 DILATION OF BILIARY TRACT: Status: ACTIVE | Noted: 2023-02-10

## 2023-02-10 PROBLEM — K80.10 CHOLECYSTITIS WITH CHOLELITHIASIS: Status: ACTIVE | Noted: 2023-02-10

## 2023-02-10 NOTE — PHYSICAL EXAM
[Normal Thyroid] : the thyroid was normal [Normal Breath Sounds] : Normal breath sounds [Normal Heart Sounds] : normal heart sounds [Normal Rate and Rhythm] : normal rate and rhythm [Abdominal Masses] : No abdominal masses [No HSM] : no hepatosplenomegaly [de-identified] :  obese, anicteric, no distress [de-identified] :  no adenopathy [de-identified] :  obese and protuberant but soft.  Gallbladder not palpable.  She is mildly tender across the entire upper abdomen, but with a positive Monreal sign in the right upper quadrant.  No CVA tenderness.

## 2023-02-10 NOTE — HISTORY OF PRESENT ILLNESS
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  She was seen in and released from the emergency department at the [facility] both last summer and last week, after doses of morphine, due to severe abdominal spasms with radiation towards the right shoulder.  She has no other specific biliary symptoms and does get relief from the use of PPIs but she had a negative EGD about 6 months ago.  She has had no recent weight loss and no history of hepatitis, pancreatitis, or jaundice.  She did have a gastric balloon placed in 2021, had a 30 pound weight loss and this was removed last year. [de-identified] :   She follows with Dr. Street from an endocrinology standpoint and has been on both Trulicity and Ozempic.  She has had no prior abdominal surgery.

## 2023-02-10 NOTE — ASSESSMENT
[FreeTextEntry1] :   Symptomatic chronic cholecystitis and cholelithiasis, for which the patient should undergo an elective cholecystectomy, so as to try to avoid the possible complications of untreated gallstones, which were explained.  She has a mildly dilated CBD and should have an MRCP preoperatively to rule out any pathology in that regard.  An appropriate requisition was provided and the surgery can be scheduled promptly once that is completed, as long as no further work-up is needed.  We discussed laparoscopic or robotic cholecystectomy with possible open exploration in detail, with their risks and benefits and all their questions were answered.  They are ready to proceed as advised and an appropriate surgical consent was obtained.  We also discussed appropriate precautions and warning signs for the interim, they are happy with the assessment and plan.

## 2023-02-10 NOTE — DATA REVIEWED
[FreeTextEntry1] : Reviewed reports of abdominal sonograms.  There are gallstones with no local acute changes, but the CBD is dilated to 8 mm where it measured 4 mm on the prior study

## 2023-02-10 NOTE — REASON FOR VISIT
[Initial Evaluation] : an initial evaluation [Parent] : parent [FreeTextEntry1] : Symptomatic gallstones

## 2023-02-13 ENCOUNTER — OUTPATIENT (OUTPATIENT)
Dept: OUTPATIENT SERVICES | Facility: HOSPITAL | Age: 38
LOS: 1 days | End: 2023-02-13
Payer: MEDICAID

## 2023-02-13 VITALS
WEIGHT: 225.09 LBS | HEIGHT: 67 IN | OXYGEN SATURATION: 99 % | TEMPERATURE: 97 F | RESPIRATION RATE: 16 BRPM | SYSTOLIC BLOOD PRESSURE: 106 MMHG | HEART RATE: 72 BPM | DIASTOLIC BLOOD PRESSURE: 68 MMHG

## 2023-02-13 DIAGNOSIS — Z98.890 OTHER SPECIFIED POSTPROCEDURAL STATES: Chronic | ICD-10-CM

## 2023-02-13 DIAGNOSIS — K80.20 CALCULUS OF GALLBLADDER WITHOUT CHOLECYSTITIS WITHOUT OBSTRUCTION: ICD-10-CM

## 2023-02-13 DIAGNOSIS — Z01.818 ENCOUNTER FOR OTHER PREPROCEDURAL EXAMINATION: ICD-10-CM

## 2023-02-13 LAB
ALBUMIN SERPL ELPH-MCNC: 4.2 G/DL — SIGNIFICANT CHANGE UP (ref 3.5–5.2)
ALP SERPL-CCNC: 129 U/L — HIGH (ref 30–115)
ALT FLD-CCNC: 35 U/L — SIGNIFICANT CHANGE UP (ref 0–41)
AMYLASE P1 CFR SERPL: 51 U/L — SIGNIFICANT CHANGE UP (ref 25–115)
ANION GAP SERPL CALC-SCNC: 7 MMOL/L — SIGNIFICANT CHANGE UP (ref 7–14)
AST SERPL-CCNC: 18 U/L — SIGNIFICANT CHANGE UP (ref 0–41)
BASOPHILS # BLD AUTO: 0.05 K/UL — SIGNIFICANT CHANGE UP (ref 0–0.2)
BASOPHILS NFR BLD AUTO: 0.9 % — SIGNIFICANT CHANGE UP (ref 0–1)
BILIRUB SERPL-MCNC: 0.3 MG/DL — SIGNIFICANT CHANGE UP (ref 0.2–1.2)
BUN SERPL-MCNC: 9 MG/DL — LOW (ref 10–20)
CALCIUM SERPL-MCNC: 9.5 MG/DL — SIGNIFICANT CHANGE UP (ref 8.4–10.5)
CHLORIDE SERPL-SCNC: 104 MMOL/L — SIGNIFICANT CHANGE UP (ref 98–110)
CO2 SERPL-SCNC: 28 MMOL/L — SIGNIFICANT CHANGE UP (ref 17–32)
CREAT SERPL-MCNC: 0.7 MG/DL — SIGNIFICANT CHANGE UP (ref 0.7–1.5)
EGFR: 116 ML/MIN/1.73M2 — SIGNIFICANT CHANGE UP
EOSINOPHIL # BLD AUTO: 0.16 K/UL — SIGNIFICANT CHANGE UP (ref 0–0.7)
EOSINOPHIL NFR BLD AUTO: 2.9 % — SIGNIFICANT CHANGE UP (ref 0–8)
GLUCOSE SERPL-MCNC: 85 MG/DL — SIGNIFICANT CHANGE UP (ref 70–99)
HCT VFR BLD CALC: 39 % — SIGNIFICANT CHANGE UP (ref 37–47)
HGB BLD-MCNC: 12.3 G/DL — SIGNIFICANT CHANGE UP (ref 12–16)
IMM GRANULOCYTES NFR BLD AUTO: 0.2 % — SIGNIFICANT CHANGE UP (ref 0.1–0.3)
LYMPHOCYTES # BLD AUTO: 1.24 K/UL — SIGNIFICANT CHANGE UP (ref 1.2–3.4)
LYMPHOCYTES # BLD AUTO: 22.1 % — SIGNIFICANT CHANGE UP (ref 20.5–51.1)
MCHC RBC-ENTMCNC: 24.8 PG — LOW (ref 27–31)
MCHC RBC-ENTMCNC: 31.5 G/DL — LOW (ref 32–37)
MCV RBC AUTO: 78.8 FL — LOW (ref 81–99)
MONOCYTES # BLD AUTO: 0.48 K/UL — SIGNIFICANT CHANGE UP (ref 0.1–0.6)
MONOCYTES NFR BLD AUTO: 8.6 % — SIGNIFICANT CHANGE UP (ref 1.7–9.3)
NEUTROPHILS # BLD AUTO: 3.66 K/UL — SIGNIFICANT CHANGE UP (ref 1.4–6.5)
NEUTROPHILS NFR BLD AUTO: 65.3 % — SIGNIFICANT CHANGE UP (ref 42.2–75.2)
NRBC # BLD: 0 /100 WBCS — SIGNIFICANT CHANGE UP (ref 0–0)
PLATELET # BLD AUTO: 266 K/UL — SIGNIFICANT CHANGE UP (ref 130–400)
POTASSIUM SERPL-MCNC: 4.7 MMOL/L — SIGNIFICANT CHANGE UP (ref 3.5–5)
POTASSIUM SERPL-SCNC: 4.7 MMOL/L — SIGNIFICANT CHANGE UP (ref 3.5–5)
PROT SERPL-MCNC: 6.5 G/DL — SIGNIFICANT CHANGE UP (ref 6–8)
RBC # BLD: 4.95 M/UL — SIGNIFICANT CHANGE UP (ref 4.2–5.4)
RBC # FLD: 13.7 % — SIGNIFICANT CHANGE UP (ref 11.5–14.5)
SODIUM SERPL-SCNC: 139 MMOL/L — SIGNIFICANT CHANGE UP (ref 135–146)
WBC # BLD: 5.6 K/UL — SIGNIFICANT CHANGE UP (ref 4.8–10.8)
WBC # FLD AUTO: 5.6 K/UL — SIGNIFICANT CHANGE UP (ref 4.8–10.8)

## 2023-02-13 PROCEDURE — 36415 COLL VENOUS BLD VENIPUNCTURE: CPT

## 2023-02-13 PROCEDURE — 85025 COMPLETE CBC W/AUTO DIFF WBC: CPT

## 2023-02-13 PROCEDURE — 82150 ASSAY OF AMYLASE: CPT

## 2023-02-13 PROCEDURE — 80053 COMPREHEN METABOLIC PANEL: CPT

## 2023-02-13 PROCEDURE — 99214 OFFICE O/P EST MOD 30 MIN: CPT | Mod: 25

## 2023-02-13 RX ORDER — DIPHENHYDRAMINE HCL 50 MG
1 CAPSULE ORAL
Qty: 0 | Refills: 0 | DISCHARGE

## 2023-02-13 RX ORDER — FLUOXETINE HCL 10 MG
80 CAPSULE ORAL
Qty: 0 | Refills: 0 | DISCHARGE

## 2023-02-13 RX ORDER — DIAZEPAM 5 MG
1 TABLET ORAL
Qty: 0 | Refills: 0 | DISCHARGE

## 2023-02-13 NOTE — H&P PST ADULT - NSANTHOSAYNRD_GEN_A_CORE
No. YADIRA screening performed.  STOP BANG Legend: 0-2 = LOW Risk; 3-4 = INTERMEDIATE Risk; 5-8 = HIGH Risk

## 2023-02-13 NOTE — H&P PST ADULT - HISTORY OF PRESENT ILLNESS
37 yr old female to past for laparoscopic cholecystectomy possible open under gen anes JESSE dr galicia 2/17/23  pt to ed  2/4/23 c/o abd pain  lfts elevated ast 200 alt 109 sono noted cholelithiasis without cholecystitis cbd measured up to 8 mm mrcp rec  appt 2/14 5 pm   pt is s/p gastric balloon placed 2021  removed now  2022 under endoscopy per pt propofol and needed atropine and dexamethasone post procedure states bp was low 70/43 ? vagal nerve stim per pt  was not admitted and no other issues  had had egd colonoscopy with no other issues 2022 2023   pt h/o stable asthma gerd add anxiety cervical and lumbar stenosis  hypoglycemia obese bmi 37 echo 7/13/22 ef     Pt reports no cardiopulmonary issues denies sob/paige/cp/palpitations. Pt states no recent infections no fever no cough no uti uri. Stated exercise tolerance is  2-3   flights no changes. [place] screen revd.   Pt denies any s/s covid 19 and reports no contact with known positive people. Pt has appointment for repeat covid testing pre op and instructed to continue to self monitor and report any concerns to MD. Pt will continue to practice self isolation and  exposure control measures pre op    Anesthesia Alert  NO--Difficult Airway  NO--History of neck surgery or radiation  NO--Limited ROM of neck  NO--History of Malignant hyperthermia  NO--No personal or family history of Pseudocholinesterase deficiency.  NO--Prior Anesthesia Complication  NO--Latex Allergy  NO--Loose teeth  NO--History of Rheumatoid Arthritis  NO--NILTON  NO--Other_____  no bleeding issues    Calculus of gallbladder without cholecystitis without obstruction    Encounter for other preprocedural examination    No pertinent family history in first degree relatives    Anxiety    History of hypoglycemia    No significant past surgical history    [id number]     37 yr old female to past for laparoscopic cholecystectomy possible open under gen anes JESSE dr galicia 2/17/23  pt to ed  2/4/23 c/o abd pain  lfts elevated ast 200 alt 109 sono noted cholelithiasis without cholecystitis cbd measured up to 8 mm mrcp rec  appt 2/14 5 pm   pt is s/p gastric balloon placed 2021  removed now  2022 under endoscopy per pt propofol and needed atropine and dexamethasone post procedure states bp was low 70/43 ? vagal nerve stim per pt  was not admitted and no other issues  had had egd colonoscopy with no other issues 2022 2023   pt h/o stable asthma gerd add anxiety cervical and lumbar stenosis  hypoglycemia obese bmi 37 echo 7/13/22 ef 58% nl lv fx on hie     Pt reports no cardiopulmonary issues denies sob/paige/cp/palpitations. Pt states no recent infections no fever no cough no uti uri. Stated exercise tolerance is  2-3   flights no changes. [place] screen revd.   Pt denies any s/s covid 19 and reports no contact with known positive people. Pt has appointment for repeat covid testing pre op and instructed to continue to self monitor and report any concerns to MD. Pt will continue to practice self isolation and  exposure control measures pre op    Anesthesia Alert  NO--Difficult Airway  NO--History of neck surgery or radiation  NO--Limited ROM of neck  NO--History of Malignant hyperthermia  NO--No personal or family history of Pseudocholinesterase deficiency.  NO--Prior Anesthesia Complication  NO--Latex Allergy  NO--Loose teeth  NO--History of Rheumatoid Arthritis  NO--NILTON  NO--Other_____  no bleeding issues    Calculus of gallbladder without cholecystitis without obstruction    Encounter for other preprocedural examination    No pertinent family history in first degree relatives    Anxiety    History of hypoglycemia    No significant past surgical history    [id number]     37 yr old female to past for laparoscopic cholecystectomy possible open under gen anes JESSE dr galicia 2/17/23  pt to ed  2/4/23 c/o abd pain  lfts elevated ast 200 alt 109 sono noted cholelithiasis without cholecystitis cbd measured up to 8 mm mrcp rec  appt 2/14 5 pm   pt is s/p gastric balloon placed 2021  removed now  2022 under endoscopy per pt propofol and needed atropine and dexamethasone post procedure states bp was low 70/43 ? vagal nerve stim per pt  was not admitted and no other issues  had had egd colonoscopy with no other issues 2022 2023   pt h/o stable asthma gerd add anxiety cervical and lumbar stenosis  hypoglycemia obese bmi 37 echo 7/13/22 ef 58% nl lv fx on hie     Pt reports no cardiopulmonary issues denies sob/paige/cp/palpitations. Pt states no recent infections no fever no cough no uti uri. Stated exercise tolerance is  2-3   flights no changes. [place] screen revd.   Pt denies any s/s covid 19 and reports no contact with known positive people. Pt has appointment for repeat covid testing pre op and instructed to continue to self monitor and report any concerns to MD. Pt will continue to practice self isolation and  exposure control measures pre op    Anesthesia Alert  NO--Difficult Airway  NO--History of neck surgery or radiation  NO--Limited ROM of neck  NO--History of Malignant hyperthermia  NO--No personal or family history of Pseudocholinesterase deficiency.  NO--Prior Anesthesia Complication  NO--Latex Allergy  NO--Loose teeth  NO--History of Rheumatoid Arthritis  NO--NILTON  yes -Other_____per pt when having endoscopy to remove gastric balloon 2015 she "had low heart rate they needed to give me atropine and dexamethasone " no other issues with any other procedures no family issues   no bleeding issues    Calculus of gallbladder without cholecystitis without obstruction    Encounter for other preprocedural examination    No pertinent family history in first degree relatives    Anxiety    History of hypoglycemia    No significant past surgical history    [id number]

## 2023-02-13 NOTE — H&P PST ADULT - OTHER CARE PROVIDERS
cardio prince lv  9/22/ note revd on hie ekg echo has appt new pt cassandra 3/23 endo ronald lv 6 mos pulm maniatis  lv 12/22 neuro anni orellana lv 9 mos

## 2023-02-13 NOTE — H&P PST ADULT - ADDITIONAL PE
abd exam obese  diff to assess grossly wnl no hsm neg galeana sign no rebound tenderness no guarding

## 2023-02-13 NOTE — H&P PST ADULT - NSICDXPASTMEDICALHX_GEN_ALL_CORE_FT
PAST MEDICAL HISTORY:  ADD (attention deficit disorder)     Anxiety     Asthma     Degenerative joint disease of cervical and lumbar spine     Tommy-Danlos syndrome     GERD (gastroesophageal reflux disease)     History of hypoglycemia     Migraine headache     Obese     Radiculopathy of cervical spine

## 2023-02-13 NOTE — H&P PST ADULT - REASON FOR ADMISSION
37 yr old female to past for laparoscopic cholecystectomy possible open under gen anes JESSE dr galicia 2/17/23

## 2023-02-14 ENCOUNTER — OUTPATIENT (OUTPATIENT)
Dept: OUTPATIENT SERVICES | Facility: HOSPITAL | Age: 38
LOS: 1 days | End: 2023-02-14
Payer: MEDICAID

## 2023-02-14 ENCOUNTER — LABORATORY RESULT (OUTPATIENT)
Age: 38
End: 2023-02-14

## 2023-02-14 ENCOUNTER — RESULT REVIEW (OUTPATIENT)
Age: 38
End: 2023-02-14

## 2023-02-14 DIAGNOSIS — Z98.890 OTHER SPECIFIED POSTPROCEDURAL STATES: Chronic | ICD-10-CM

## 2023-02-14 DIAGNOSIS — K80.10 CALCULUS OF GALLBLADDER WITH CHRONIC CHOLECYSTITIS WITHOUT OBSTRUCTION: ICD-10-CM

## 2023-02-14 DIAGNOSIS — K82.9 DISEASE OF GALLBLADDER, UNSPECIFIED: ICD-10-CM

## 2023-02-14 DIAGNOSIS — K80.20 CALCULUS OF GALLBLADDER WITHOUT CHOLECYSTITIS WITHOUT OBSTRUCTION: ICD-10-CM

## 2023-02-14 DIAGNOSIS — Z01.818 ENCOUNTER FOR OTHER PREPROCEDURAL EXAMINATION: ICD-10-CM

## 2023-02-14 PROCEDURE — 74181 MRI ABDOMEN W/O CONTRAST: CPT | Mod: 26

## 2023-02-14 PROCEDURE — 74181 MRI ABDOMEN W/O CONTRAST: CPT

## 2023-02-15 ENCOUNTER — NON-APPOINTMENT (OUTPATIENT)
Age: 38
End: 2023-02-15

## 2023-02-15 DIAGNOSIS — K80.10 CALCULUS OF GALLBLADDER WITH CHRONIC CHOLECYSTITIS WITHOUT OBSTRUCTION: ICD-10-CM

## 2023-02-15 PROBLEM — F98.8 OTHER SPECIFIED BEHAVIORAL AND EMOTIONAL DISORDERS WITH ONSET USUALLY OCCURRING IN CHILDHOOD AND ADOLESCENCE: Chronic | Status: ACTIVE | Noted: 2023-02-13

## 2023-02-15 PROBLEM — E66.9 OBESITY, UNSPECIFIED: Chronic | Status: ACTIVE | Noted: 2023-02-13

## 2023-02-15 PROBLEM — M47.816 SPONDYLOSIS WITHOUT MYELOPATHY OR RADICULOPATHY, LUMBAR REGION: Chronic | Status: ACTIVE | Noted: 2023-02-13

## 2023-02-15 PROBLEM — G43.909 MIGRAINE, UNSPECIFIED, NOT INTRACTABLE, WITHOUT STATUS MIGRAINOSUS: Chronic | Status: ACTIVE | Noted: 2023-02-13

## 2023-02-15 PROBLEM — M54.12 RADICULOPATHY, CERVICAL REGION: Chronic | Status: ACTIVE | Noted: 2023-02-13

## 2023-02-15 PROBLEM — J45.909 UNSPECIFIED ASTHMA, UNCOMPLICATED: Chronic | Status: ACTIVE | Noted: 2023-02-13

## 2023-02-15 PROBLEM — K21.9 GASTRO-ESOPHAGEAL REFLUX DISEASE WITHOUT ESOPHAGITIS: Chronic | Status: ACTIVE | Noted: 2023-02-13

## 2023-02-15 PROBLEM — Q79.60 EHLERS-DANLOS SYNDROME, UNSPECIFIED: Chronic | Status: ACTIVE | Noted: 2023-02-13

## 2023-02-17 ENCOUNTER — APPOINTMENT (OUTPATIENT)
Dept: SURGERY | Facility: AMBULATORY SURGERY CENTER | Age: 38
End: 2023-02-17

## 2023-02-21 ENCOUNTER — APPOINTMENT (OUTPATIENT)
Dept: CARDIOLOGY | Facility: CLINIC | Age: 38
End: 2023-02-21

## 2023-02-27 ENCOUNTER — NON-APPOINTMENT (OUTPATIENT)
Age: 38
End: 2023-02-27

## 2023-03-01 ENCOUNTER — RESULT REVIEW (OUTPATIENT)
Age: 38
End: 2023-03-01

## 2023-03-01 ENCOUNTER — OUTPATIENT (OUTPATIENT)
Dept: OUTPATIENT SERVICES | Facility: HOSPITAL | Age: 38
LOS: 1 days | End: 2023-03-01
Payer: MEDICAID

## 2023-03-01 DIAGNOSIS — Z98.890 OTHER SPECIFIED POSTPROCEDURAL STATES: Chronic | ICD-10-CM

## 2023-03-01 DIAGNOSIS — K80.10 CALCULUS OF GALLBLADDER WITH CHRONIC CHOLECYSTITIS WITHOUT OBSTRUCTION: ICD-10-CM

## 2023-03-01 PROCEDURE — 78227 HEPATOBIL SYST IMAGE W/DRUG: CPT | Mod: 26

## 2023-03-01 PROCEDURE — A9537: CPT

## 2023-03-01 PROCEDURE — 78227 HEPATOBIL SYST IMAGE W/DRUG: CPT

## 2023-03-02 ENCOUNTER — APPOINTMENT (OUTPATIENT)
Dept: SURGERY | Facility: CLINIC | Age: 38
End: 2023-03-02

## 2023-03-02 ENCOUNTER — APPOINTMENT (OUTPATIENT)
Dept: PULMONOLOGY | Facility: CLINIC | Age: 38
End: 2023-03-02
Payer: MEDICAID

## 2023-03-02 VITALS
HEIGHT: 67 IN | HEART RATE: 106 BPM | SYSTOLIC BLOOD PRESSURE: 122 MMHG | DIASTOLIC BLOOD PRESSURE: 70 MMHG | OXYGEN SATURATION: 97 %

## 2023-03-02 DIAGNOSIS — K80.10 CALCULUS OF GALLBLADDER WITH CHRONIC CHOLECYSTITIS WITHOUT OBSTRUCTION: ICD-10-CM

## 2023-03-02 DIAGNOSIS — U07.1 COVID-19: ICD-10-CM

## 2023-03-02 PROCEDURE — 99214 OFFICE O/P EST MOD 30 MIN: CPT

## 2023-03-02 NOTE — DISCUSSION/SUMMARY
[FreeTextEntry1] : Miri comes in and tells me about a 15-minute story of all the GI problems she had in the hospital and who is following up on them.  She also contracted COVID-19 in the hospital and indicated that she had 1 very severe coughing and wheezing and shortness of breath reaction but was gone in the day.  She still coughs a little bit now.  She is she states that the Incruse Ellipta was doing nothing for her but when she asks me why she coughs a little bit each time she gets a cold I tell her it is probably her mild asthma which was treated by the Incruse Ellipta so we are ordering the Incruse Ellipta again we are ordering albuterol again and I have okayed ten 1 mg Decadron pills.  She likes Decadron and although it is not my favorite way to approach the asthma there is nothing really wrong with it.\par \par She is awake alert oriented.  Skin is warm and dry.  Her chest is perfectly clear.  S1-S2.  Abdomen is obese.  There is no clubbing cyanosis or edema.\par \par My overall impression is stable asthma with significant GI problems and she is following up with her surgeons and gastroenterologist.  See her in 6 months.  End of dictation

## 2023-03-03 ENCOUNTER — NON-APPOINTMENT (OUTPATIENT)
Age: 38
End: 2023-03-03

## 2023-03-07 ENCOUNTER — APPOINTMENT (OUTPATIENT)
Dept: OTOLARYNGOLOGY | Facility: CLINIC | Age: 38
End: 2023-03-07
Payer: MEDICAID

## 2023-03-07 VITALS — WEIGHT: 220 LBS | HEIGHT: 67 IN | BODY MASS INDEX: 34.53 KG/M2

## 2023-03-07 DIAGNOSIS — J02.9 ACUTE PHARYNGITIS, UNSPECIFIED: ICD-10-CM

## 2023-03-07 PROCEDURE — 92557 COMPREHENSIVE HEARING TEST: CPT

## 2023-03-07 PROCEDURE — 92567 TYMPANOMETRY: CPT

## 2023-03-07 PROCEDURE — 99204 OFFICE O/P NEW MOD 45 MIN: CPT | Mod: 25

## 2023-03-07 PROCEDURE — 31575 DIAGNOSTIC LARYNGOSCOPY: CPT

## 2023-03-07 NOTE — ASSESSMENT
[FreeTextEntry1] : Advised to stop meclizine gradually and do a VNG.\par \par I reviewed, interpreted, and discussed the Audiogram done today.\par

## 2023-03-07 NOTE — HISTORY OF PRESENT ILLNESS
[de-identified] : Patient presents today c/p dizziness, fluid in ear and right swollen lymph node.    Patient states she has been having some room spinning dizziness since vertigo 3 weeks ago.  Patient has s right swollen lymph node .  She also has some fluid in the right ear. Used augmentin and medrol.

## 2023-03-07 NOTE — REASON FOR VISIT
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

## 2023-03-09 ENCOUNTER — OUTPATIENT (OUTPATIENT)
Dept: OUTPATIENT SERVICES | Facility: HOSPITAL | Age: 38
LOS: 1 days | End: 2023-03-09
Payer: MEDICAID

## 2023-03-09 DIAGNOSIS — Z98.890 OTHER SPECIFIED POSTPROCEDURAL STATES: Chronic | ICD-10-CM

## 2023-03-09 DIAGNOSIS — Z00.8 ENCOUNTER FOR OTHER GENERAL EXAMINATION: ICD-10-CM

## 2023-03-09 DIAGNOSIS — R92.8 OTHER ABNORMAL AND INCONCLUSIVE FINDINGS ON DIAGNOSTIC IMAGING OF BREAST: ICD-10-CM

## 2023-03-09 PROCEDURE — G0279: CPT

## 2023-03-09 PROCEDURE — 77065 DX MAMMO INCL CAD UNI: CPT | Mod: 26,LT

## 2023-03-09 PROCEDURE — 77065 DX MAMMO INCL CAD UNI: CPT | Mod: LT

## 2023-03-09 PROCEDURE — 77061 BREAST TOMOSYNTHESIS UNI: CPT | Mod: 26

## 2023-03-10 DIAGNOSIS — R92.8 OTHER ABNORMAL AND INCONCLUSIVE FINDINGS ON DIAGNOSTIC IMAGING OF BREAST: ICD-10-CM

## 2023-04-10 ENCOUNTER — APPOINTMENT (OUTPATIENT)
Dept: CARDIOLOGY | Facility: CLINIC | Age: 38
End: 2023-04-10

## 2023-04-19 ENCOUNTER — APPOINTMENT (OUTPATIENT)
Dept: NEUROSURGERY | Facility: CLINIC | Age: 38
End: 2023-04-19

## 2023-05-02 RX ORDER — METOPROLOL TARTRATE 25 MG/1
25 TABLET, FILM COATED ORAL
Qty: 2 | Refills: 0 | Status: DISCONTINUED | COMMUNITY
Start: 2022-09-13 | End: 2023-05-02

## 2023-05-02 RX ORDER — DIAZEPAM 10 MG/1
10 TABLET ORAL
Qty: 120 | Refills: 0 | Status: DISCONTINUED | COMMUNITY
Start: 2020-11-23 | End: 2023-05-02

## 2023-05-02 RX ORDER — METHYLPREDNISOLONE 4 MG/1
4 TABLET ORAL
Qty: 5 | Refills: 0 | Status: DISCONTINUED | COMMUNITY
Start: 2023-03-07 | End: 2023-05-02

## 2023-05-02 RX ORDER — LEVOMEFOLATE MAGNESIUM, LEUCOVORIN, FOLIC ACID, FERROUS CYSTEINE GLYCINATE, MAGNESIUM ASCORBATE, ZINC ASCORBATE, COCARBOXYLASE, FLAVIN ADENINE DINUCLEOTIDE, NADH, PYRIDOXAL PHOSPHATE ANHYDROUS, COBAMAMIDE, BETAINE, MAGNESIUM L-THREONATE, 1,2-DOCOSAHEXANOYL-SN-GLYCERO-3-PHOSPHOSERINE CALCIUM, 1,2-ICOSAPENTOYL-SN-GLYCERO-3-PHOSPHOSERINE CALCIUM, AND PHOSPHATIDYL SERINE 5.23; 2.5; 1; 13.6; 24; 1; 25; 25; 25; 25; 50; 500; 1; 6.4; 800; 12 MG/1; MG/1; MG/1; MG/1; MG/1; MG/1; UG/1; UG/1; UG/1; UG/1; UG/1; UG/1; MG/1; MG/1; UG/1; MG/1
CAPSULE, DELAYED RELEASE PELLETS ORAL
Qty: 30 | Refills: 0 | Status: DISCONTINUED | COMMUNITY
Start: 2020-11-25 | End: 2023-05-02

## 2023-05-02 RX ORDER — FLUOXETINE HYDROCHLORIDE 20 MG/1
20 CAPSULE ORAL
Qty: 120 | Refills: 2 | Status: DISCONTINUED | COMMUNITY
Start: 2021-04-29 | End: 2023-05-02

## 2023-05-03 ENCOUNTER — APPOINTMENT (OUTPATIENT)
Dept: GASTROENTEROLOGY | Facility: CLINIC | Age: 38
End: 2023-05-03
Payer: MEDICAID

## 2023-05-03 DIAGNOSIS — K21.9 GASTRO-ESOPHAGEAL REFLUX DISEASE W/OUT ESOPHAGITIS: ICD-10-CM

## 2023-05-03 DIAGNOSIS — R14.0 ABDOMINAL DISTENSION (GASEOUS): ICD-10-CM

## 2023-05-03 DIAGNOSIS — Z86.59 PERSONAL HISTORY OF OTHER MENTAL AND BEHAVIORAL DISORDERS: ICD-10-CM

## 2023-05-03 DIAGNOSIS — Z86.39 PERSONAL HISTORY OF OTHER ENDOCRINE, NUTRITIONAL AND METABOLIC DISEASE: ICD-10-CM

## 2023-05-03 DIAGNOSIS — Z87.09 PERSONAL HISTORY OF OTHER DISEASES OF THE RESPIRATORY SYSTEM: ICD-10-CM

## 2023-05-03 DIAGNOSIS — Z78.9 OTHER SPECIFIED HEALTH STATUS: ICD-10-CM

## 2023-05-03 DIAGNOSIS — R11.10 VOMITING, UNSPECIFIED: ICD-10-CM

## 2023-05-03 DIAGNOSIS — Z87.898 PERSONAL HISTORY OF OTHER SPECIFIED CONDITIONS: ICD-10-CM

## 2023-05-03 DIAGNOSIS — R10.9 UNSPECIFIED ABDOMINAL PAIN: ICD-10-CM

## 2023-05-03 PROCEDURE — 99443: CPT

## 2023-05-03 RX ORDER — BUPROPION HYDROCHLORIDE 300 MG/1
300 TABLET, EXTENDED RELEASE ORAL
Refills: 0 | Status: ACTIVE | COMMUNITY

## 2023-05-05 ENCOUNTER — APPOINTMENT (OUTPATIENT)
Dept: NEUROLOGY | Facility: CLINIC | Age: 38
End: 2023-05-05
Payer: MEDICAID

## 2023-05-05 VITALS — DIASTOLIC BLOOD PRESSURE: 69 MMHG | SYSTOLIC BLOOD PRESSURE: 101 MMHG | HEART RATE: 97 BPM

## 2023-05-05 VITALS — TEMPERATURE: 98 F | OXYGEN SATURATION: 98 % | WEIGHT: 230 LBS | BODY MASS INDEX: 36.1 KG/M2 | HEIGHT: 67 IN

## 2023-05-05 DIAGNOSIS — R41.3 OTHER AMNESIA: ICD-10-CM

## 2023-05-05 PROCEDURE — 99215 OFFICE O/P EST HI 40 MIN: CPT

## 2023-05-11 NOTE — DISCUSSION/SUMMARY
[FreeTextEntry1] : Ms. Deshpande is a 37yo woman being seen for follow up of her lower back pain, neck pain and migraines.  She has had increasing lower back pain over the last few months and her neck pain has improved.  \par She saw neurosurgery and was not deemed a candidate for surgery.  She would like surgery because she is afraid as she is getting older she will then need surgery and it will be harder for her.  Since her pain has improved then surgery is not necessary.\par 1. MRI brain for vertigo and memory distrubance\par 2. MRI cervical spine for worsening radiculopathy\par 3. Eletriptan PRN trial for migraines\par 4. Neuropsych testing for ADD diagnosis\par 5. Vestibular therapy\par 6. Botox evaluation for migraines\par 7. f/u in 3 months TEB\par

## 2023-05-11 NOTE — HISTORY OF PRESENT ILLNESS
[FreeTextEntry1] : Ms. Deshpande is a 39yo woman being seen for follow up of her lower back pain, neck pain and migraines.  She has had increasing lower back pain over the last few months and her neck pain has improved.  \par She saw neurosurgery and was not deemed a candidate for surgery.  She would like surgery because she is afraid as she is getting older she will then need surgery and it will be harder for her.  Since her pain has improved then surgery is not necessary.\par \par The emgality has helped significantly with migraines and they have been reduced to almost none to 1 per month.\par \par She is dealing with GI issues now and had a transient episode of elevated liver enzymes.  She has been getting severe abdominal pain and saw GI doctor this morning.  She saw pulmonary for what she believes is post covid breathing issues\par She has lost about 60 pounds in last 6 months \par

## 2023-05-19 NOTE — ASSESSMENT
[FreeTextEntry1] : 38 yr old female with IBS-C for F/U. She has been having attacks of severe upper abdominal pains, brought on by eating fatty foods such as pizza or drinking champagne. She was hospitalized 2/23 and US of abdomen showed  dilated CBD. Her LFTs were noted to be increased at that time. MRCP  done 2/14/23 did not show any CBD dilation. HIDA scan was also done and was normal. She has been taking Omeprazole OD with decreased abdominal pain. She denied jaundice, vomiting, blood in the stool, melena or weight loss. \par \par Abdominal pain with increased LFTs\par \par - Omeprazole should not be helping the pain (she may have GERD as well) if there is a stone in the CBD or if there is a Sphincter of Oddi dysfunction but in view of her symptoms and increased LFTs, will consider an ERCP\par - F/U in the office next week (in person) to determine absolute need for ERCP\par - She was told to do a trial of very fatty foods to see if it brings on an attack \par - LFTs from 2/23 reviewed; ,  with normal total bilirubin and normal Alk phos\par - MRCP from 2/23 reviewed and was normal without any dilatation of the CBD or stones noted in the CBD

## 2023-05-19 NOTE — HISTORY OF PRESENT ILLNESS
[Home] : at home, [unfilled] , at the time of the visit. [Medical Office: ([facility])___] : at the medical office located in  [Verbal consent obtained from patient] : the patient, [unfilled] [FreeTextEntry4] : Wilda [FreeTextEntry1] : 8/12/21

## 2023-05-24 ENCOUNTER — APPOINTMENT (OUTPATIENT)
Dept: GASTROENTEROLOGY | Facility: CLINIC | Age: 38
End: 2023-05-24
Payer: MEDICAID

## 2023-05-24 VITALS — HEART RATE: 99 BPM | HEIGHT: 67 IN | OXYGEN SATURATION: 99 % | WEIGHT: 240 LBS | BODY MASS INDEX: 37.67 KG/M2

## 2023-05-24 DIAGNOSIS — Z86.010 PERSONAL HISTORY OF COLONIC POLYPS: ICD-10-CM

## 2023-05-24 DIAGNOSIS — Z87.898 PERSONAL HISTORY OF OTHER SPECIFIED CONDITIONS: ICD-10-CM

## 2023-05-24 DIAGNOSIS — Z87.19 PERSONAL HISTORY OF OTHER DISEASES OF THE DIGESTIVE SYSTEM: ICD-10-CM

## 2023-05-24 DIAGNOSIS — Z86.79 PERSONAL HISTORY OF OTHER DISEASES OF THE CIRCULATORY SYSTEM: ICD-10-CM

## 2023-05-24 DIAGNOSIS — K59.00 CONSTIPATION, UNSPECIFIED: ICD-10-CM

## 2023-05-24 DIAGNOSIS — R14.3 FLATULENCE: ICD-10-CM

## 2023-05-24 DIAGNOSIS — Z86.59 PERSONAL HISTORY OF OTHER MENTAL AND BEHAVIORAL DISORDERS: ICD-10-CM

## 2023-05-24 DIAGNOSIS — R10.10 UPPER ABDOMINAL PAIN, UNSPECIFIED: ICD-10-CM

## 2023-05-24 DIAGNOSIS — Z80.0 FAMILY HISTORY OF MALIGNANT NEOPLASM OF DIGESTIVE ORGANS: ICD-10-CM

## 2023-05-24 DIAGNOSIS — Z87.2 PERSONAL HISTORY OF DISEASES OF THE SKIN AND SUBCUTANEOUS TISSUE: ICD-10-CM

## 2023-05-24 DIAGNOSIS — R79.89 OTHER SPECIFIED ABNORMAL FINDINGS OF BLOOD CHEMISTRY: ICD-10-CM

## 2023-05-24 DIAGNOSIS — R11.0 NAUSEA: ICD-10-CM

## 2023-05-24 DIAGNOSIS — R14.0 ABDOMINAL DISTENSION (GASEOUS): ICD-10-CM

## 2023-05-24 DIAGNOSIS — G43.909 MIGRAINE, UNSPECIFIED, NOT INTRACTABLE, W/OUT STATUS MIGRAINOSUS: ICD-10-CM

## 2023-05-24 PROCEDURE — 99213 OFFICE O/P EST LOW 20 MIN: CPT

## 2023-05-24 RX ORDER — DEXTROAMPHETAMINE SACCHARATE, AMPHETAMINE ASPARTATE, DEXTROAMPHETAMINE SULFATE AND AMPHETAMINE SULFATE 7.5; 7.5; 7.5; 7.5 MG/1; MG/1; MG/1; MG/1
30 TABLET ORAL
Qty: 10 | Refills: 0 | Status: DISCONTINUED | COMMUNITY
Start: 2019-10-23 | End: 2023-05-24

## 2023-05-24 RX ORDER — UMECLIDINIUM 62.5 UG/1
62.5 AEROSOL, POWDER ORAL
Qty: 30 | Refills: 5 | Status: DISCONTINUED | COMMUNITY
End: 2023-05-24

## 2023-05-24 NOTE — PHYSICAL EXAM
[Alert] : alert [Normal Voice/Communication] : normal voice/communication [No Acute Distress] : no acute distress [Obese (BMI >= 30)] : obese (BMI >= 30) [Sclera] : the sclera and conjunctiva were normal

## 2023-05-26 NOTE — HISTORY OF PRESENT ILLNESS
[FreeTextEntry1] : 38 yr old female with IBS-C for F/U. She has been having attacks of severe upper abdominal pains, brought on by eating fatty foods such as pizza or drinking champagne. She was hospitalized 2/23 and US of abdomen showed  dilated CBD. Her LFTs were noted to be increased at that time. MRCP  done 2/14/23 did not show any CBD dilation. HIDA scan was also done and was normal. She has been taking Omeprazole OD with no further abdominal spasms in 3 months.  She denied jaundice, vomiting, blood in the stool, melena or weight loss. \par

## 2023-05-26 NOTE — ASSESSMENT
[FreeTextEntry1] : 38 yr old female with IBS-C for F/U. She has been having attacks of severe upper abdominal pains, brought on by eating fatty foods such as pizza or drinking champagne. She was hospitalized 2/23 and US of abdomen showed  dilated CBD. Her LFTs were noted to be increased at that time. MRCP  done 2/14/23 did not show any CBD dilation. HIDA scan was also done and was normal. She has been taking Omeprazole OD with decreased abdominal pain. She denied jaundice, vomiting, blood in the stool, melena or weight loss. \par \par Abdominal pain with increased LFTs\par \par - Omeprazole should not be helping the pain (she may have GERD as well) if there is a stone in the CBD or if there is a Sphincter of Oddi dysfunction but in view of her symptoms and increased LFTs\par - She was told to do a trial of very fatty foods to see if it brings on an attack which she did and stated that it did not bring on an attack \par - LFTs from 2/23 reviewed; ,  with normal total bilirubin and normal Alk phos\par - MRCP from 2/23 reviewed and was normal without any dilatation of the CBD or stones noted in the CBD\par - Continue PPI OD \par - F/U in 2 months

## 2023-06-19 ENCOUNTER — APPOINTMENT (OUTPATIENT)
Dept: OTOLARYNGOLOGY | Facility: CLINIC | Age: 38
End: 2023-06-19

## 2023-06-28 ENCOUNTER — APPOINTMENT (OUTPATIENT)
Dept: OTOLARYNGOLOGY | Facility: CLINIC | Age: 38
End: 2023-06-28
Payer: MEDICAID

## 2023-06-28 DIAGNOSIS — R42 DIZZINESS AND GIDDINESS: ICD-10-CM

## 2023-06-28 PROCEDURE — 99213 OFFICE O/P EST LOW 20 MIN: CPT

## 2023-06-28 NOTE — HISTORY OF PRESENT ILLNESS
[FreeTextEntry1] : Patient  following  up on dizziness , migraines, nausea .  Last dizziness episode was last night. Patient did not do the VNG test.  She was rescheduled 4 times.Her neurologist is sending her for vestibular therapy .

## 2023-06-28 NOTE — ASSESSMENT
[FreeTextEntry1] : Dizziness 70% better.\par \par Did not do the VNG yet.\par \par Getting tonsil stones.\par Recommended waterpick and baking soda. \par \par

## 2023-07-12 ENCOUNTER — APPOINTMENT (OUTPATIENT)
Dept: GASTROENTEROLOGY | Facility: CLINIC | Age: 38
End: 2023-07-12
Payer: MEDICAID

## 2023-07-12 VITALS
DIASTOLIC BLOOD PRESSURE: 86 MMHG | HEART RATE: 95 BPM | OXYGEN SATURATION: 99 % | SYSTOLIC BLOOD PRESSURE: 111 MMHG | BODY MASS INDEX: 38.45 KG/M2 | WEIGHT: 245 LBS | HEIGHT: 67 IN

## 2023-07-12 DIAGNOSIS — R10.9 UNSPECIFIED ABDOMINAL PAIN: ICD-10-CM

## 2023-07-12 DIAGNOSIS — K59.00 CONSTIPATION, UNSPECIFIED: ICD-10-CM

## 2023-07-12 PROCEDURE — 99213 OFFICE O/P EST LOW 20 MIN: CPT

## 2023-07-12 RX ORDER — QUETIAPINE 25 MG/1
25 TABLET, FILM COATED ORAL
Refills: 0 | Status: DISCONTINUED | COMMUNITY
End: 2023-07-12

## 2023-07-12 RX ORDER — PANTOPRAZOLE 40 MG/1
40 TABLET, DELAYED RELEASE ORAL DAILY
Qty: 30 | Refills: 6 | Status: ACTIVE | COMMUNITY
Start: 1900-01-01 | End: 1900-01-01

## 2023-07-17 ENCOUNTER — EMERGENCY (EMERGENCY)
Facility: HOSPITAL | Age: 38
LOS: 0 days | Discharge: HOME | End: 2023-07-17
Attending: EMERGENCY MEDICINE
Payer: MEDICAID

## 2023-07-17 VITALS
DIASTOLIC BLOOD PRESSURE: 70 MMHG | SYSTOLIC BLOOD PRESSURE: 107 MMHG | TEMPERATURE: 99 F | RESPIRATION RATE: 18 BRPM | HEIGHT: 67 IN | OXYGEN SATURATION: 97 % | HEART RATE: 96 BPM | WEIGHT: 250 LBS
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
## 2023-07-17 DIAGNOSIS — J45.909 UNSPECIFIED ASTHMA, UNCOMPLICATED: ICD-10-CM

## 2023-07-17 DIAGNOSIS — S01.411A LACERATION WITHOUT FOREIGN BODY OF RIGHT CHEEK AND TEMPOROMANDIBULAR AREA, INITIAL ENCOUNTER: ICD-10-CM

## 2023-07-17 DIAGNOSIS — Z23 ENCOUNTER FOR IMMUNIZATION: ICD-10-CM

## 2023-07-17 DIAGNOSIS — Q79.60 EHLERS-DANLOS SYNDROME, UNSPECIFIED: ICD-10-CM

## 2023-07-17 DIAGNOSIS — F98.8 OTHER SPECIFIED BEHAVIORAL AND EMOTIONAL DISORDERS WITH ONSET USUALLY OCCURRING IN CHILDHOOD AND ADOLESCENCE: ICD-10-CM

## 2023-07-17 DIAGNOSIS — Z98.890 OTHER SPECIFIED POSTPROCEDURAL STATES: Chronic | ICD-10-CM

## 2023-07-17 DIAGNOSIS — F41.9 ANXIETY DISORDER, UNSPECIFIED: ICD-10-CM

## 2023-07-17 DIAGNOSIS — G43.909 MIGRAINE, UNSPECIFIED, NOT INTRACTABLE, WITHOUT STATUS MIGRAINOSUS: ICD-10-CM

## 2023-07-17 PROCEDURE — 90471 IMMUNIZATION ADMIN: CPT

## 2023-07-17 PROCEDURE — 99283 EMERGENCY DEPT VISIT LOW MDM: CPT | Mod: 25

## 2023-07-17 PROCEDURE — 90715 TDAP VACCINE 7 YRS/> IM: CPT

## 2023-07-17 PROCEDURE — 99284 EMERGENCY DEPT VISIT MOD MDM: CPT

## 2023-07-17 RX ORDER — BUPROPION HYDROCHLORIDE 150 MG/1
300 TABLET, EXTENDED RELEASE ORAL
Qty: 0 | Refills: 0 | DISCHARGE

## 2023-07-17 RX ORDER — OMEPRAZOLE 10 MG/1
1 CAPSULE, DELAYED RELEASE ORAL
Qty: 0 | Refills: 0 | DISCHARGE

## 2023-07-17 RX ORDER — DIPHENHYDRAMINE HCL 50 MG
1 CAPSULE ORAL
Qty: 0 | Refills: 0 | DISCHARGE

## 2023-07-17 RX ORDER — IBUPROFEN 200 MG
1 TABLET ORAL
Qty: 0 | Refills: 0 | DISCHARGE

## 2023-07-17 RX ORDER — QUETIAPINE FUMARATE 200 MG/1
0 TABLET, FILM COATED ORAL
Refills: 0 | DISCHARGE

## 2023-07-17 RX ORDER — UBROGEPANT 100 MG/1
1 TABLET ORAL
Qty: 0 | Refills: 0 | DISCHARGE

## 2023-07-17 RX ORDER — SEMAGLUTIDE 0.68 MG/ML
0 INJECTION, SOLUTION SUBCUTANEOUS
Qty: 0 | Refills: 0 | DISCHARGE

## 2023-07-17 RX ORDER — FLUOXETINE HCL 10 MG
60 CAPSULE ORAL
Qty: 0 | Refills: 0 | DISCHARGE

## 2023-07-17 RX ORDER — TETANUS TOXOID, REDUCED DIPHTHERIA TOXOID AND ACELLULAR PERTUSSIS VACCINE, ADSORBED 5; 2.5; 8; 8; 2.5 [IU]/.5ML; [IU]/.5ML; UG/.5ML; UG/.5ML; UG/.5ML
0.5 SUSPENSION INTRAMUSCULAR ONCE
Refills: 0 | Status: COMPLETED | OUTPATIENT
Start: 2023-07-17 | End: 2023-07-17

## 2023-07-17 RX ORDER — HYDROXYZINE HCL 10 MG
1 TABLET ORAL
Qty: 0 | Refills: 0 | DISCHARGE

## 2023-07-17 RX ADMIN — TETANUS TOXOID, REDUCED DIPHTHERIA TOXOID AND ACELLULAR PERTUSSIS VACCINE, ADSORBED 0.5 MILLILITER(S): 5; 2.5; 8; 8; 2.5 SUSPENSION INTRAMUSCULAR at 19:10

## 2023-07-17 NOTE — ED PROVIDER NOTE - CLINICAL SUMMARY MEDICAL DECISION MAKING FREE TEXT BOX
38yF Ehler Danlos syndrome migraines p/w facial laceration from cat scratch (not bite).  Wound is clean w/o concern for embedded FB or secondary bacterial infection.  Tdap updated.  Wound cleansed and repaired by plastics.  Recommend supportive care, o/p f/u as per plastics for wound check and suture removal, return precautions.

## 2023-07-17 NOTE — ED PROVIDER NOTE - CARE PROVIDER_API CALL
Perez Herrera   Plastic Surgery  [address] [place] [address]  Phone: [phone]  Fax: [phone]  Follow Up Time:

## 2023-07-17 NOTE — ED PROVIDER NOTE - PATIENT PORTAL LINK FT
You can access the FollowMyHealth Patient Portal offered by [facility] by registering at the following website: [email or web address]. By joining [employer]’s FollowMyHealth portal, you will also be able to view your health information using other applications (apps) compatible with our system.

## 2023-07-17 NOTE — ED PROVIDER NOTE - NSFOLLOWUPINSTRUCTIONS_ED_ALL_ED_FT
Follow up with Dr. Herrera as instructed.  Contact him or return to the ED if the wound gets red, puffy/swollen, tender or starts draining blood or pus.    Laceration Care, Adult  A laceration is a cut that goes through all layers of the skin. The cut also goes into the tissue that is right under the skin. Some cuts heal on their own. Others need to be closed with stitches (sutures), staples, skin adhesive strips, or wound glue. Taking care of your cut lowers your risk of infection and helps your cut to heal better.    How to take care of your cut  For stitches or staples:     Keep the wound clean and dry.  If you were given a bandage (dressing), you should change it at least one time per day or as told by your doctor. You should also change it if it gets wet or dirty.  Keep the wound completely dry for the first 24 hours or as told by your doctor. After that time, you may take a shower or a bath. However, make sure that the wound is not soaked in water until after the stitches or staples have been removed.  Clean the wound one time each day or as told by your doctor:    Wash the wound with soap and water.  Rinse the wound with water until all of the soap comes off.  Pat the wound dry with a clean towel. Do not rub the wound.    After you clean the wound, put a thin layer of antibiotic ointment on it as told by your doctor. This ointment:    Helps to prevent infection.  Keeps the bandage from sticking to the wound.    Have your stitches or staples removed as told by your doctor.  If your doctor used skin adhesive strips:     Keep the wound clean and dry.  If you were given a bandage, you should change it at least one time per day or as told by your doctor. You should also change it if it gets dirty or wet.  Do not get the skin adhesive strips wet. You can take a shower or a bath, but be careful to keep the wound dry.  If the wound gets wet, pat it dry with a clean towel. Do not rub the wound.  Skin adhesive strips fall off on their own. You can trim the strips as the wound heals. Do not remove any strips that are still stuck to the wound. They will fall off after a while.  If your doctor used wound glue:     Try to keep your wound dry, but you may briefly wet it in the shower or bath. Do not soak the wound in water, such as by swimming.  After you take a shower or a bath, gently pat the wound dry with a clean towel. Do not rub the wound.  Do not do any activities that will make you really sweaty until the skin glue has fallen off on its own.  Do not apply liquid, cream, or ointment medicine to your wound while the skin glue is still on.  If you were given a bandage, you should change it at least one time per day or as told by your doctor. You should also change it if it gets dirty or wet.  If a bandage is placed over the wound, do not let the tape for the bandage touch the skin glue.  Do not pick at the glue. The skin glue usually stays on for 5–10 days. Then, it falls off of the skin.  General Instructions     To help prevent scarring, make sure to cover your wound with sunscreen whenever you are outside after stitches are removed, after adhesive strips are removed, or when wound glue stays in place and the wound is healed. Make sure to wear a sunscreen of at least 30 SPF.  Take over-the-counter and prescription medicines only as told by your doctor.  If you were given antibiotic medicine or ointment, take or apply it as told by your doctor. Do not stop using the antibiotic even if your wound is getting better.  Do not scratch or pick at the wound.  Keep all follow-up visits as told by your doctor. This is important.  Check your wound every day for signs of infection. Watch for:    Redness, swelling, or pain.  Fluid, blood, or pus.    Raise (elevate) the injured area above the level of your heart while you are sitting or lying down, if possible.  Get help if:  You got a tetanus shot and you have any of these problems at the injection site:    Swelling.  Very bad pain.  Redness.  Bleeding.    You have a fever.  A wound that was closed breaks open.  You notice a bad smell coming from your wound or your bandage.  You notice something coming out of the wound, such as wood or glass.  Medicine does not help your pain.  You have more redness, swelling, or pain at the site of your wound.  You have fluid, blood, or pus coming from your wound.  You notice a change in the color of your skin near your wound.  You need to change the bandage often because fluid, blood, or pus is coming from the wound.  You start to have a new rash.  You start to have numbness around the wound.  Get help right away if:  You have very bad swelling around the wound.  Your pain suddenly gets worse and is very bad.  You notice painful lumps near the wound or on skin that is anywhere on your body.  You have a red streak going away from your wound.  The wound is on your hand or foot and you cannot move a finger or toe like you usually can.  The wound is on your hand or foot and you notice that your fingers or toes look pale or bluish.  This information is not intended to replace advice given to you by your health care provider. Make sure you discuss any questions you have with your health care provider.

## 2023-07-17 NOTE — ED PROVIDER NOTE - OBJECTIVE STATEMENT
38yF p/w lac to face under R eyelid - scratched by her cat, says she had to pull cat's paw out of her face bc paw/claws had been embedded in her face.  No bite.  No concern for fractured tooth.  Tdap unknown.  Cat is hers and is acting appropriately.

## 2023-07-17 NOTE — ED ADULT NURSE NOTE - NSFALLUNIVINTERV_ED_ALL_ED
Bed/Stretcher in lowest position, wheels locked, appropriate side rails in place/Call bell, personal items and telephone in reach/Instruct patient to call for assistance before getting out of bed/chair/stretcher/Non-slip footwear applied when patient is off stretcher/[place] to call system/Physically safe environment - no spills, clutter or unnecessary equipment/Purposeful proactive rounding/Room/bathroom lighting operational, light cord in reach

## 2023-07-17 NOTE — ED PROVIDER NOTE - PHYSICAL EXAMINATION
CONSTITUTIONAL: well developed; well nourished; well appearing in no acute distress  HEAD: normocephalic; atraumatic  EYES: no conjunctival injection, no scleral icterus  ENT: no nasal discharge; airway clear.  NECK: supple; non tender. + full passive ROM in all directions  CARD: warm and well perfused, not tachycardic  RESP: breathing comfortably on RA, speaking in full sentences w/o distress  EXT: moving all extremities spontaneously, normal ROM. No clubbing, cyanosis or edema  SKIN: warm and dry, +laceration to R cheek below lower eyelid w/o eyelid involvement  NEURO: alert, oriented, CN II-XII grossly intact, motor and sensory grossly intact, speech nonslurred, no focal deficits. GCS 15  PSYCH: calm, cooperative, appropriate, good eye contact, logical thought process, no apparent danger to self or others

## 2023-07-25 NOTE — ASSESSMENT
[FreeTextEntry1] : 38 yr old female with IBS-C for F/U. She has been having attacks of severe upper abdominal pains, brought on by eating fatty foods such as pizza or drinking champagne. She was hospitalized 2/23 and US of abdomen showed  dilated CBD. Her LFTs were noted to be increased at that time. MRCP  done 2/14/23 did not show any CBD dilation. HIDA scan was also done and was normal. She has been taking Omeprazole OD with decreased abdominal pain. She denied jaundice, vomiting, blood in the stool, melena or weight loss. She denied further abdominal spasms since her last visit in May. She C/O worsening constipation; she has a BM q 2 days with hard stools and increased straining. She sometimes notes blood in the toilet after increased straining. She has tried Miralax without success. She had a colonoscopy done yesterday which did not reveal any polyps or other abnormalities. \par \par \par Abdominal pain with H/O  increased LFTs, suspected GERD (pain alleviated by PPI\par \par - Continue PPI OD\par - LFTs from 2/23 reviewed; ,  with normal total bilirubin and normal Alk phos\par - MRCP from 2/23 reviewed and was normal without any dilatation of the CBD or stones noted in the CBD\par \par Fatty Liver most likely due to Obesity (LUX)\par \par - Will do full CLD panel\par - F/U in 1 month\par - She was strongly advised to lose weight\par \par Constipation\par \par - Will try Amitiza 24 mcg bid with food\par - High fiber diet, adequate water intake and regular physical  activity recommended\par

## 2023-07-28 ENCOUNTER — APPOINTMENT (OUTPATIENT)
Dept: CARDIOLOGY | Facility: CLINIC | Age: 38
End: 2023-07-28

## 2023-08-03 DIAGNOSIS — R51.9 HEADACHE, UNSPECIFIED: ICD-10-CM

## 2023-08-04 ENCOUNTER — NON-APPOINTMENT (OUTPATIENT)
Age: 38
End: 2023-08-04

## 2023-08-04 ENCOUNTER — APPOINTMENT (OUTPATIENT)
Dept: NEUROLOGY | Facility: CLINIC | Age: 38
End: 2023-08-04
Payer: MEDICAID

## 2023-08-04 VITALS
TEMPERATURE: 97.8 F | OXYGEN SATURATION: 99 % | BODY MASS INDEX: 39.24 KG/M2 | HEART RATE: 100 BPM | DIASTOLIC BLOOD PRESSURE: 80 MMHG | HEIGHT: 67 IN | WEIGHT: 250 LBS | SYSTOLIC BLOOD PRESSURE: 133 MMHG

## 2023-08-04 PROCEDURE — 99212 OFFICE O/P EST SF 10 MIN: CPT

## 2023-08-04 NOTE — ASSESSMENT
[FreeTextEntry1] : Ms. COLEY 38 year old female presents today for a botox evaluation as migraine treatment. She has tried multiple medications for migraines and none of which have provided adequate relief. She is currently experiencing migraines almost daily. Will proceed with botox as migraine treatment.   Plan:  -Continue care with Dr. Hernandez  -Start Botox treatment when approved by insurance.

## 2023-08-04 NOTE — HISTORY OF PRESENT ILLNESS
[FreeTextEntry1] : Ms. COLEY 38 year old female who presents today for botox evaluation as migraine treatment. Is currently working as a school nurse.  Has been taking Emgality since May. Her insurance does not cover the high dose which she feels has helped her migraines initially. Has tried Nurtec, Aimovig, Triptans, Nortriptyline, and Topamax. None of these medications have provided her with adequate relief.  She is getting migraines almost daily, occasionally gets ipsilateral tearing, gets nausea and dizziness with her migraines, as well as an aura. >15 migraines per month. Takes triptan for migraine relief, +4 Advils. Migraines will affect her for 3 days at a time.

## 2023-08-04 NOTE — PHYSICAL EXAM
[FreeTextEntry1] : Mental status: Awake, alert and oriented x3.  Recent and remote memory intact.  Naming, repetition and comprehension intact.  Attention/concentration intact.  No dysarthria, no aphasia.  Fund of knowledge appropriate.   Cranial nerves: Pupils equally round and reactive to light, visual fields full, no nystagmus, extraocular muscles intact, V1 through V3 intact bilaterally and symmetric, face symmetric, hearing intact to finger rub, palate elevation symmetric, tongue was midline.  Motor:  Moves extremities freely. Normal tone and bulk.  No abnormal movements.   Gait: Narrow and steady. No ataxia.  Romberg negative

## 2023-08-07 ENCOUNTER — APPOINTMENT (OUTPATIENT)
Dept: OTOLARYNGOLOGY | Facility: CLINIC | Age: 38
End: 2023-08-07
Payer: MEDICAID

## 2023-08-07 DIAGNOSIS — R42 DIZZINESS AND GIDDINESS: ICD-10-CM

## 2023-08-07 DIAGNOSIS — J35.8 OTHER CHRONIC DISEASES OF TONSILS AND ADENOIDS: ICD-10-CM

## 2023-08-07 DIAGNOSIS — R09.81 NASAL CONGESTION: ICD-10-CM

## 2023-08-07 PROCEDURE — 92537 CALORIC VSTBLR TEST W/REC: CPT

## 2023-08-07 PROCEDURE — 31231 NASAL ENDOSCOPY DX: CPT

## 2023-08-07 PROCEDURE — 99214 OFFICE O/P EST MOD 30 MIN: CPT | Mod: 25

## 2023-08-07 PROCEDURE — 92540 BASIC VESTIBULAR EVALUATION: CPT

## 2023-08-07 PROCEDURE — 92547 SUPPLEMENTAL ELECTRICAL TEST: CPT

## 2023-08-07 RX ORDER — AZELASTINE HYDROCHLORIDE 137 UG/1
137 SPRAY, METERED NASAL DAILY
Qty: 1 | Refills: 3 | Status: ACTIVE | COMMUNITY
Start: 2023-08-07 | End: 1900-01-01

## 2023-08-07 RX ORDER — FLUTICASONE PROPIONATE 50 UG/1
50 SPRAY, METERED NASAL
Qty: 2 | Refills: 2 | Status: ACTIVE | COMMUNITY
Start: 2023-08-07 | End: 1900-01-01

## 2023-08-07 NOTE — HISTORY OF PRESENT ILLNESS
[FreeTextEntry1] : Patient returns today c/o vertigo, dizziness, migraine, throat pain. States that she is still dizzy. Has not started Vestibular Therapy yet but was able to book appoint 8/18. States that she needs script for Vestibular Therapy.  Was dizzy 2 days ago with nausea. Occasional throat pain that comes and goes, had a previous tonsil stones, removed with waterpic and has been gargling with backing soda. VNG performed today. Here to discuss results.  Also complaining of nasal congestion, bilateral, on and off.

## 2023-08-07 NOTE — PROCEDURE
[Recalcitrant Symptoms] : recalcitrant symptoms  [Anterior rhinoscopy insufficient to account for symptoms] : anterior rhinoscopy insufficient to account for symptoms [None] : none [Flexible Endoscope] : examined with the flexible endoscope [Congested] : congested [Deviated to the Rt] : deviated to the right [Normal] : the paranasal sinuses had no abnormalities

## 2023-08-07 NOTE — ASSESSMENT
[FreeTextEntry1] : patient going for CT of the head.  VNG results reviewed and discussed. Possible vestibular migraine associated.   Tonsil stones every few months. WIll continue conservative measures.

## 2023-08-07 NOTE — REASON FOR VISIT
[Subsequent Evaluation] : a subsequent evaluation for [FreeTextEntry2] : vertigo, dizziness, migraine, throat pain

## 2023-08-08 ENCOUNTER — APPOINTMENT (OUTPATIENT)
Dept: CARDIOLOGY | Facility: CLINIC | Age: 38
End: 2023-08-08
Payer: MEDICAID

## 2023-08-08 VITALS
DIASTOLIC BLOOD PRESSURE: 78 MMHG | WEIGHT: 250 LBS | BODY MASS INDEX: 39.24 KG/M2 | HEIGHT: 67 IN | HEART RATE: 93 BPM | SYSTOLIC BLOOD PRESSURE: 130 MMHG

## 2023-08-08 DIAGNOSIS — R74.8 ABNORMAL LEVELS OF OTHER SERUM ENZYMES: ICD-10-CM

## 2023-08-08 DIAGNOSIS — M79.89 OTHER SPECIFIED SOFT TISSUE DISORDERS: ICD-10-CM

## 2023-08-08 PROCEDURE — 93000 ELECTROCARDIOGRAM COMPLETE: CPT

## 2023-08-08 PROCEDURE — 99214 OFFICE O/P EST MOD 30 MIN: CPT | Mod: 25

## 2023-08-08 RX ORDER — RIMEGEPANT SULFATE 75 MG/75MG
TABLET, ORALLY DISINTEGRATING ORAL DAILY
Qty: 8 | Refills: 1 | Status: DISCONTINUED | COMMUNITY
Start: 2023-05-05 | End: 2023-08-08

## 2023-08-08 RX ORDER — LUBIPROSTONE 24 UG/1
24 CAPSULE ORAL TWICE DAILY
Qty: 60 | Refills: 5 | Status: DISCONTINUED | COMMUNITY
Start: 2023-07-12 | End: 2023-08-08

## 2023-08-09 ENCOUNTER — APPOINTMENT (OUTPATIENT)
Dept: OTOLARYNGOLOGY | Facility: CLINIC | Age: 38
End: 2023-08-09

## 2023-08-09 NOTE — REVIEW OF SYSTEMS
[Feeling Fatigued] : feeling fatigued [Palpitations] : palpitations [Abdominal Pain] : abdominal pain [Negative] : Heme/Lymph

## 2023-08-09 NOTE — HISTORY OF PRESENT ILLNESS
[FreeTextEntry1] : Ms. Deshpande is a 38-year-old woman with history of presumed Ehler-Danlos syndrome presenting for follow up.  Patient previously followed with Dr. Gonzalez and was last seen in office 9/2022. Today, notes occasional palpitations. Is concerned about her cardiac health because of her history of EDS. Was recently admitted for evaluation of abdominal pain with liver test abnormalities. Extensive workup including MRCP and HIDA scan were unremarkable. She recalls being told she could be considered for an ERCP but was advised against this. Also recalls being told she may have sphincter of Oddi syndrome.  No overt exertional chest pain or dyspnea on this visit.  TTE 7/2022 - Normal systolic function, no significant valvular disease  14 Day Event Monitor 2021 - Grossly normal study, no arrhythmia  AAA US 2021 - No AAA  CTA w/w/o Contast 2021 - No PE, unremarkable study  Labs: - , TG 65, HDL 67, LDL 86 - Cr 0.7, K 4.1, , , AlkP 117  Noted Meds: - Wellbutrin - Sumatriptan - Seroquel - Ozempic - Emgality - Pantoprazole

## 2023-08-09 NOTE — ASSESSMENT
[FreeTextEntry1] : Ms. 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  Impression: (1) Palpitations, grossly unchanged from prior evaluation, minimally symptomatic (2) Presumed Tommy-Danlos syndrome (3) Recent admission for abdominal pain with abnormal liver tests. Normal MRCP and HIDA scans.  Plan: - TTE to assess ascending Ao - Discussed utility of event monitoring; deferring for now - Repeat cardiometabolic labs. - Patient inquired about CT aorta imaging; advised against this to reduce radiation exposure. Several CTs done within the past few years, including a CT chest with contrast in 2021 demonstrating normal aortic anatomy.  RTC 6 months, sooner as needed

## 2023-08-16 ENCOUNTER — APPOINTMENT (OUTPATIENT)
Dept: GASTROENTEROLOGY | Facility: CLINIC | Age: 38
End: 2023-08-16
Payer: MEDICAID

## 2023-08-16 DIAGNOSIS — K58.1 IRRITABLE BOWEL SYNDROME WITH CONSTIPATION: ICD-10-CM

## 2023-08-16 DIAGNOSIS — K21.9 GASTRO-ESOPHAGEAL REFLUX DISEASE W/OUT ESOPHAGITIS: ICD-10-CM

## 2023-08-16 DIAGNOSIS — Z78.9 OTHER SPECIFIED HEALTH STATUS: ICD-10-CM

## 2023-08-16 DIAGNOSIS — K76.0 FATTY (CHANGE OF) LIVER, NOT ELSEWHERE CLASSIFIED: ICD-10-CM

## 2023-08-16 PROCEDURE — XXXXX: CPT | Mod: 1L

## 2023-08-17 PROBLEM — K21.9 GERD (GASTROESOPHAGEAL REFLUX DISEASE): Status: ACTIVE | Noted: 2021-03-24

## 2023-08-17 PROBLEM — K76.0 FATTY LIVER: Status: ACTIVE | Noted: 2023-07-12

## 2023-08-17 PROBLEM — Z78.9 SOCIAL ALCOHOL USE: Status: ACTIVE | Noted: 2023-08-15

## 2023-08-17 PROBLEM — Z78.9 NON-SMOKER: Status: ACTIVE | Noted: 2023-08-15

## 2023-08-17 PROBLEM — K58.1 IRRITABLE BOWEL SYNDROME WITH CONSTIPATION: Status: ACTIVE | Noted: 2021-03-24

## 2023-08-17 PROBLEM — Z78.9 CAFFEINE USE: Status: ACTIVE | Noted: 2023-08-15

## 2023-08-17 NOTE — ASSESSMENT
[FreeTextEntry1] : 38 yr old female with IBS-C for F/U. She has been having attacks of severe upper abdominal pains, brought on by eating fatty foods such as pizza or drinking champagne. She was hospitalized 2/23 and US of abdomen showed  dilated CBD. Her LFTs were noted to be increased at that time. MRCP  done 2/14/23 did not show any CBD dilation. HIDA scan was also done and was normal. She has been taking Omeprazole OD with decreased abdominal pain. She denied jaundice, vomiting, blood in the stool, melena or weight loss. She denied further abdominal spasms since her last visit in May. She C/O worsening constipation; she has a BM q 2 days with hard stools and increased straining. She sometimes notes blood in the toilet after increased straining. She has tried Miralax without success. She had a colonoscopy done yesterday which did not reveal any polyps or other abnormalities.    Abdominal pain with H/O increased LFTs, suspected GERD (pain alleviated by PPIo)  - Continue PPI OD - LFTs from 2/23 reviewed; ,  with normal total bilirubin and normal Alk phos - MRCP from 2/23 reviewed and was normal without any dilatation of the CBD or stones noted in the CBD  Fatty Liver most likely due to Obesity (LUX)  - CLD panel reviewed and did not show any other causes of increased LFTs and repeat Hepatic function panel was normal. - She was strongly advised to lose weight  Constipation  - Will try Amitiza 24 mcg bid with food - High fiber diet, adequate water intake and regular physical activity recommended  Iron Deficiency Anemia  - She was told to F/U with her GI MD for an EGD, Colonoscopy, and capsule

## 2023-08-18 ENCOUNTER — OUTPATIENT (OUTPATIENT)
Dept: OUTPATIENT SERVICES | Facility: HOSPITAL | Age: 38
LOS: 1 days | End: 2023-08-18
Payer: MEDICAID

## 2023-08-18 DIAGNOSIS — Z98.890 OTHER SPECIFIED POSTPROCEDURAL STATES: Chronic | ICD-10-CM

## 2023-08-18 DIAGNOSIS — R42 DIZZINESS AND GIDDINESS: ICD-10-CM

## 2023-08-18 PROCEDURE — 97162 PT EVAL MOD COMPLEX 30 MIN: CPT | Mod: GP

## 2023-08-19 DIAGNOSIS — R42 DIZZINESS AND GIDDINESS: ICD-10-CM

## 2023-08-23 DIAGNOSIS — R79.0 ABNORMAL LVL OF BLOOD MINERAL: ICD-10-CM

## 2023-08-24 ENCOUNTER — OUTPATIENT (OUTPATIENT)
Dept: OUTPATIENT SERVICES | Facility: HOSPITAL | Age: 38
LOS: 1 days | End: 2023-08-24

## 2023-08-24 DIAGNOSIS — Z98.890 OTHER SPECIFIED POSTPROCEDURAL STATES: Chronic | ICD-10-CM

## 2023-08-24 DIAGNOSIS — R42 DIZZINESS AND GIDDINESS: ICD-10-CM

## 2023-08-24 DIAGNOSIS — D50.9 IRON DEFICIENCY ANEMIA, UNSPECIFIED: ICD-10-CM

## 2023-08-25 ENCOUNTER — APPOINTMENT (OUTPATIENT)
Dept: NEUROLOGY | Facility: CLINIC | Age: 38
End: 2023-08-25
Payer: MEDICAID

## 2023-08-25 ENCOUNTER — APPOINTMENT (OUTPATIENT)
Dept: PULMONOLOGY | Facility: CLINIC | Age: 38
End: 2023-08-25
Payer: MEDICAID

## 2023-08-25 VITALS
OXYGEN SATURATION: 98 % | HEIGHT: 67 IN | DIASTOLIC BLOOD PRESSURE: 80 MMHG | WEIGHT: 250 LBS | BODY MASS INDEX: 39.24 KG/M2 | HEART RATE: 90 BPM | SYSTOLIC BLOOD PRESSURE: 118 MMHG

## 2023-08-25 PROCEDURE — 64615 CHEMODENERV MUSC MIGRAINE: CPT

## 2023-08-25 PROCEDURE — 99213 OFFICE O/P EST LOW 20 MIN: CPT

## 2023-08-25 RX ORDER — BUDESONIDE AND FORMOTEROL FUMARATE DIHYDRATE 160; 4.5 UG/1; UG/1
160-4.5 AEROSOL RESPIRATORY (INHALATION) TWICE DAILY
Qty: 1 | Refills: 3 | Status: ACTIVE | COMMUNITY
Start: 2023-08-25 | End: 1900-01-01

## 2023-08-25 RX ORDER — ALBUTEROL SULFATE 90 UG/1
108 (90 BASE) INHALANT RESPIRATORY (INHALATION) EVERY 4 HOURS
Qty: 1 | Refills: 5 | Status: ACTIVE | COMMUNITY
Start: 2023-08-25 | End: 1900-01-01

## 2023-08-25 RX ORDER — DEXAMETHASONE 1 MG/1
1 TABLET ORAL DAILY
Qty: 20 | Refills: 0 | Status: ACTIVE | COMMUNITY
Start: 2023-08-25 | End: 1900-01-01

## 2023-08-25 RX ADMIN — ONABOTULINUMTOXINA 1 UNIT: 100 INJECTION, POWDER, LYOPHILIZED, FOR SOLUTION INTRADERMAL; INTRAMUSCULAR at 00:00

## 2023-08-25 NOTE — HISTORY OF PRESENT ILLNESS
[TextBox_4] : So Dr. Dallas before for asthma post-COVID was given Incruse which she is noncompliant and using as needed rescue inhaler also she was giving Decadron that she take as needed she denies sleep apnea symptoms

## 2023-08-25 NOTE — PROCEDURE
[FreeTextEntry3] : Botulinum toxin for chronic migraine  Risks and benefits of procedure explained.   200 Units botulinum toxin (Lot # [id number] exp: 10/2025) reconstituted with 4 ml 0.9NS (5U/0.1cc).   Patient prepped with alcohol/ethyl chloride. 10U corrugators, 5U procerus, 20U frontalis, 40 temporalis, 30 occipitalis, 20 cervical paraspinals, 30 trapezius. Total of 155U divided in 31 sites. 45U botulinum wasted.   Patient had vasovagal symptoms in between the procedure. Vital signs were stable, and she got better after 5 minutes resting.

## 2023-08-30 ENCOUNTER — OUTPATIENT (OUTPATIENT)
Dept: OUTPATIENT SERVICES | Facility: HOSPITAL | Age: 38
LOS: 1 days | End: 2023-08-30

## 2023-08-30 DIAGNOSIS — Z98.890 OTHER SPECIFIED POSTPROCEDURAL STATES: Chronic | ICD-10-CM

## 2023-08-30 DIAGNOSIS — R42 DIZZINESS AND GIDDINESS: ICD-10-CM

## 2023-09-13 ENCOUNTER — OUTPATIENT (OUTPATIENT)
Dept: OUTPATIENT SERVICES | Facility: HOSPITAL | Age: 38
LOS: 1 days | End: 2023-09-13
Payer: MEDICAID

## 2023-09-13 DIAGNOSIS — R42 DIZZINESS AND GIDDINESS: ICD-10-CM

## 2023-09-13 DIAGNOSIS — Z98.890 OTHER SPECIFIED POSTPROCEDURAL STATES: Chronic | ICD-10-CM

## 2023-09-13 PROCEDURE — 97112 NEUROMUSCULAR REEDUCATION: CPT | Mod: GP

## 2023-09-14 ENCOUNTER — APPOINTMENT (OUTPATIENT)
Dept: PULMONOLOGY | Facility: CLINIC | Age: 38
End: 2023-09-14

## 2023-09-14 DIAGNOSIS — R42 DIZZINESS AND GIDDINESS: ICD-10-CM

## 2023-09-15 ENCOUNTER — RX RENEWAL (OUTPATIENT)
Age: 38
End: 2023-09-15

## 2023-09-25 ENCOUNTER — APPOINTMENT (OUTPATIENT)
Dept: CARDIOLOGY | Facility: CLINIC | Age: 38
End: 2023-09-25

## 2023-09-27 ENCOUNTER — OUTPATIENT (OUTPATIENT)
Dept: OUTPATIENT SERVICES | Facility: HOSPITAL | Age: 38
LOS: 1 days | End: 2023-09-27

## 2023-09-27 DIAGNOSIS — R42 DIZZINESS AND GIDDINESS: ICD-10-CM

## 2023-09-27 DIAGNOSIS — Z98.890 OTHER SPECIFIED POSTPROCEDURAL STATES: Chronic | ICD-10-CM

## 2023-10-05 ENCOUNTER — APPOINTMENT (OUTPATIENT)
Dept: NEUROLOGY | Facility: CLINIC | Age: 38
End: 2023-10-05
Payer: MEDICAID

## 2023-10-05 VITALS
HEIGHT: 67 IN | HEART RATE: 88 BPM | DIASTOLIC BLOOD PRESSURE: 87 MMHG | SYSTOLIC BLOOD PRESSURE: 131 MMHG | WEIGHT: 250 LBS | BODY MASS INDEX: 39.24 KG/M2

## 2023-10-05 PROCEDURE — 99214 OFFICE O/P EST MOD 30 MIN: CPT

## 2023-10-05 RX ORDER — ALBUTEROL SULFATE 90 UG/1
108 (90 BASE) INHALANT RESPIRATORY (INHALATION)
Qty: 1 | Refills: 3 | Status: DISCONTINUED | COMMUNITY
End: 2023-10-05

## 2023-10-24 NOTE — HISTORY OF PRESENT ILLNESS
[FreeTextEntry1] : Patient is a 36M with PMH of ADD, anxiety, asthma, GERD, Tommy-Danlos syndrome, PCOS who presents for evaluation of rectocele.  Patient states she has constipation. She takes Miralax senna and dulcolax to help have BM.  When she does have BM she often feels bulging into the vagina and has to splint at times.  She states she has previously had imaging that showed a rectocele. Patient denies fevers, chills, nausea, vomiting, abdominal pain, diarrhea, blood in his stool or unexpected weight loss.  Patient denies a family history of colon cancer rectal cancer or inflammatory bowel disease.  Patient is pending colonoscopy with Dr. Schaffer. Quit 10 yrs ago/cigarettes

## 2023-11-07 ENCOUNTER — RESULT REVIEW (OUTPATIENT)
Age: 38
End: 2023-11-07

## 2023-11-07 ENCOUNTER — OUTPATIENT (OUTPATIENT)
Dept: OUTPATIENT SERVICES | Facility: HOSPITAL | Age: 38
LOS: 1 days | End: 2023-11-07
Payer: MEDICAID

## 2023-11-07 DIAGNOSIS — R42 DIZZINESS AND GIDDINESS: ICD-10-CM

## 2023-11-07 DIAGNOSIS — Z98.890 OTHER SPECIFIED POSTPROCEDURAL STATES: Chronic | ICD-10-CM

## 2023-11-07 PROCEDURE — A9579: CPT

## 2023-11-07 PROCEDURE — 70553 MRI BRAIN STEM W/O & W/DYE: CPT | Mod: 26

## 2023-11-07 PROCEDURE — 70553 MRI BRAIN STEM W/O & W/DYE: CPT

## 2023-11-08 DIAGNOSIS — R42 DIZZINESS AND GIDDINESS: ICD-10-CM

## 2023-11-10 ENCOUNTER — APPOINTMENT (OUTPATIENT)
Dept: OTOLARYNGOLOGY | Facility: CLINIC | Age: 38
End: 2023-11-10

## 2023-12-15 ENCOUNTER — APPOINTMENT (OUTPATIENT)
Dept: NEUROLOGY | Facility: CLINIC | Age: 38
End: 2023-12-15
Payer: MEDICAID

## 2023-12-15 PROCEDURE — 64615 CHEMODENERV MUSC MIGRAINE: CPT

## 2023-12-15 NOTE — PROCEDURE
[FreeTextEntry3] :  Risks and benefits of procedure explained.  200 Units botulinum toxin (Lot #[id number] exp: 10/2025) reconstituted with 4 ml 0.9NS (5U/0.1cc).  Patient prepped with alcohol/ethyl chloride. , 5U procerus, 20U frontalis, 40 temporalis, 30 occipitalis, 20 cervical paraspinals, 30 trapezius. Total of 155U divided in 31 sites. 55U botulinum wasted. 10U corrugators were skipped due to prior ptosis

## 2024-02-02 ENCOUNTER — APPOINTMENT (OUTPATIENT)
Dept: NEUROLOGY | Facility: CLINIC | Age: 39
End: 2024-02-02
Payer: MEDICAID

## 2024-02-02 DIAGNOSIS — G43.909 MIGRAINE, UNSPECIFIED, NOT INTRACTABLE, W/OUT STATUS MIGRAINOSUS: ICD-10-CM

## 2024-02-02 PROCEDURE — G2211 COMPLEX E/M VISIT ADD ON: CPT | Mod: NC,1L

## 2024-02-02 PROCEDURE — 99214 OFFICE O/P EST MOD 30 MIN: CPT

## 2024-02-02 RX ORDER — ELETRIPTAN HYDROBROMIDE 20 MG/1
20 TABLET, FILM COATED ORAL
Qty: 12 | Refills: 3 | Status: ACTIVE | COMMUNITY
Start: 2023-05-05 | End: 1900-01-01

## 2024-02-02 NOTE — PHYSICAL EXAM
[FreeTextEntry1] : MS: Awake, alert, oriented to person, place, situation and time.  Follows commands.   Language: Speech is clear, fluent. No dysarthria.   CNs 2 - 12 intact. EOMI no nystagmus, no diplopia. No facial asymmetry b/l. Tongue midline, normal movements, no atrophy.  Motor: Normal muscle bulk & tone. No noticeable tremor or seizure. No pronator drift. Muscle strength of b/l UE and b/l LE 5/5.   Sensation: Intact to LT b/l throughout, Vib, Temp symmetric  Cortical: No extinction  Coordination: Intact rapid-alternating movements. FTN deviates slightly to the right with eyes closed DTR: 2+ in biceps, brachioradialis and triceps; 1+ in patellar and ankle; plantars are down b/l  Gait: No postural instability. Normal stance and tandem gait. Marching in place with eyes closed she deviates slightly to the right and forward

## 2024-02-02 NOTE — HISTORY OF PRESENT ILLNESS
[FreeTextEntry1] : Ms. Deshpande is a 39yo woman being seen for follow up of her lower back pain, neck pain and migraines. She has done Botox last injection 12/15/2023.  She says that the migraines still worsen towards the end of the month.  She is having issues focusing with her eyes.  She was doing better with dizziness but recently it has started bothering her again.  She did have an MRI joey and IAC which was normal She believes her memory is continuously getting worse  The emgality has helped significantly with migraines and they have been reduced to almost none to 1 per month.

## 2024-02-02 NOTE — DISCUSSION/SUMMARY
[FreeTextEntry1] : Ms. Deshpande is a 37yo woman being seen for follow up of her lower back pain, neck pain and migraines.  She has had increasing lower back pain over the last few months and her neck pain has improved.  Her migraines are somewhat stable with emaglity and eletriptan with botox.  She conitnues to have memory issues and intermittent vertigo  1. Neuropsych testing and vestibular therapy 2. Continue Botox  3. Continue triptans, emgality 4. Recommended yoga 5. f/u in 6 months

## 2024-02-06 ENCOUNTER — APPOINTMENT (OUTPATIENT)
Dept: CARDIOLOGY | Facility: CLINIC | Age: 39
End: 2024-02-06
Payer: MEDICAID

## 2024-02-06 VITALS
DIASTOLIC BLOOD PRESSURE: 82 MMHG | SYSTOLIC BLOOD PRESSURE: 130 MMHG | HEIGHT: 67 IN | HEART RATE: 98 BPM | BODY MASS INDEX: 41.44 KG/M2 | WEIGHT: 264 LBS

## 2024-02-06 DIAGNOSIS — R06.02 SHORTNESS OF BREATH: ICD-10-CM

## 2024-02-06 DIAGNOSIS — E66.01 MORBID (SEVERE) OBESITY DUE TO EXCESS CALORIES: ICD-10-CM

## 2024-02-06 DIAGNOSIS — R00.2 PALPITATIONS: ICD-10-CM

## 2024-02-06 DIAGNOSIS — Q79.60 EHLERS-DANLOS SYNDROME, UNSP: ICD-10-CM

## 2024-02-06 PROCEDURE — 93242 EXT ECG>48HR<7D RECORDING: CPT

## 2024-02-06 PROCEDURE — 93000 ELECTROCARDIOGRAM COMPLETE: CPT

## 2024-02-06 PROCEDURE — 99214 OFFICE O/P EST MOD 30 MIN: CPT | Mod: 25

## 2024-02-06 RX ORDER — GALCANEZUMAB 120 MG/ML
120 INJECTION, SOLUTION SUBCUTANEOUS
Refills: 0 | Status: ACTIVE | COMMUNITY

## 2024-02-06 RX ORDER — FAMOTIDINE 40 MG/1
40 TABLET, FILM COATED ORAL
Refills: 0 | Status: ACTIVE | COMMUNITY

## 2024-02-06 RX ORDER — HYDROXYZINE HYDROCHLORIDE 50 MG/1
TABLET ORAL
Refills: 0 | Status: ACTIVE | COMMUNITY

## 2024-02-06 RX ORDER — LUBIPROSTONE 24 UG/1
24 CAPSULE ORAL TWICE DAILY
Qty: 60 | Refills: 5 | Status: DISCONTINUED | COMMUNITY
Start: 2023-08-17 | End: 2024-02-06

## 2024-02-06 RX ORDER — SEMAGLUTIDE 2.68 MG/ML
INJECTION, SOLUTION SUBCUTANEOUS
Refills: 0 | Status: ACTIVE | COMMUNITY

## 2024-02-06 RX ORDER — ONDANSETRON 8 MG/1
8 TABLET ORAL
Refills: 0 | Status: ACTIVE | COMMUNITY
Start: 2023-11-16

## 2024-02-06 RX ORDER — QUETIAPINE 50 MG/1
50 TABLET, FILM COATED ORAL
Refills: 0 | Status: ACTIVE | COMMUNITY

## 2024-02-06 NOTE — HISTORY OF PRESENT ILLNESS
[FreeTextEntry1] : Ms. Deshpande is a 38-year-old woman with history of presumed Ehler-Danlos syndrome presenting for follow up.  Patient previously followed with Dr. Gonzalez and was last seen in office 9/2022. Was recently admitted for evaluation of abdominal pain with liver test abnormalities. Extensive workup including MRCP and HIDA scan were unremarkable. She recalls being told she could be considered for an ERCP but was advised against this. Also recalls being told she may have sphincter of Oddi syndrome.  Today notes several complaints, including palpitations, exertional dyspnea, and concern for thoracic aortic aneurysm in light of her history of EDS.  TTE 7/2022 - Normal systolic function, no significant valvular disease  14 Day Event Monitor 2021 - Grossly normal study, no arrhythmia  AAA US 2021 - No AAA  Labs: - , TG 65, HDL 67, LDL 86 - Cr 0.7, K 4.1, , , AlkP 117

## 2024-02-06 NOTE — ASSESSMENT
[FreeTextEntry1] : Ms. 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  Impression: (1) Palpitations, grossly unchanged from prior evaluation, minimally symptomatic (2) Chart diagnosis Tommy-Danlos syndrome (3) Dyspnea on exertion  Plan: - Stress echo - MRA to assess for TAA - 3 day event monitor - Start GLP-1 agonist for obesity - Repeat cardiometabolic labs.  RTC 6 months, sooner as needed

## 2024-02-18 ENCOUNTER — NON-APPOINTMENT (OUTPATIENT)
Age: 39
End: 2024-02-18

## 2024-02-21 ENCOUNTER — APPOINTMENT (OUTPATIENT)
Dept: PULMONOLOGY | Facility: CLINIC | Age: 39
End: 2024-02-21
Payer: MEDICAID

## 2024-02-21 VITALS
HEART RATE: 110 BPM | WEIGHT: 258 LBS | SYSTOLIC BLOOD PRESSURE: 130 MMHG | DIASTOLIC BLOOD PRESSURE: 65 MMHG | OXYGEN SATURATION: 98 % | BODY MASS INDEX: 40.41 KG/M2

## 2024-02-21 DIAGNOSIS — G47.33 OBSTRUCTIVE SLEEP APNEA (ADULT) (PEDIATRIC): ICD-10-CM

## 2024-02-21 DIAGNOSIS — J45.909 UNSPECIFIED ASTHMA, UNCOMPLICATED: ICD-10-CM

## 2024-02-21 PROCEDURE — 99213 OFFICE O/P EST LOW 20 MIN: CPT | Mod: 25

## 2024-02-21 PROCEDURE — 94727 GAS DIL/WSHOT DETER LNG VOL: CPT

## 2024-02-21 PROCEDURE — 94729 DIFFUSING CAPACITY: CPT

## 2024-02-21 PROCEDURE — G2211 COMPLEX E/M VISIT ADD ON: CPT | Mod: NC,1L

## 2024-02-21 PROCEDURE — 94010 BREATHING CAPACITY TEST: CPT

## 2024-02-21 RX ORDER — ALBUTEROL SULFATE 90 UG/1
108 (90 BASE) INHALANT RESPIRATORY (INHALATION) EVERY 4 HOURS
Qty: 1 | Refills: 3 | Status: ACTIVE | COMMUNITY
Start: 2024-02-21 | End: 1900-01-01

## 2024-02-21 RX ORDER — PREDNISONE 20 MG/1
20 TABLET ORAL
Qty: 18 | Refills: 0 | Status: ACTIVE | COMMUNITY
Start: 2024-02-21 | End: 1900-01-01

## 2024-02-21 RX ORDER — BUDESONIDE AND FORMOTEROL FUMARATE DIHYDRATE 160; 4.5 UG/1; UG/1
160-4.5 AEROSOL RESPIRATORY (INHALATION) TWICE DAILY
Qty: 1 | Refills: 5 | Status: ACTIVE | COMMUNITY
Start: 2024-02-21 | End: 1900-01-01

## 2024-02-24 ENCOUNTER — EMERGENCY (EMERGENCY)
Facility: HOSPITAL | Age: 39
LOS: 0 days | Discharge: HOME | End: 2024-02-25
Attending: EMERGENCY MEDICINE
Payer: MEDICAID

## 2024-02-24 VITALS
WEIGHT: 210.1 LBS | OXYGEN SATURATION: 99 % | SYSTOLIC BLOOD PRESSURE: 124 MMHG | HEART RATE: 95 BPM | HEIGHT: 67 IN | DIASTOLIC BLOOD PRESSURE: 77 MMHG | RESPIRATION RATE: 18 BRPM | TEMPERATURE: 98 F

## 2024-02-24 DIAGNOSIS — F41.9 ANXIETY DISORDER, UNSPECIFIED: ICD-10-CM

## 2024-02-24 DIAGNOSIS — Z87.440 PERSONAL HISTORY OF URINARY (TRACT) INFECTIONS: ICD-10-CM

## 2024-02-24 DIAGNOSIS — Z87.448 PERSONAL HISTORY OF OTHER DISEASES OF URINARY SYSTEM: ICD-10-CM

## 2024-02-24 DIAGNOSIS — R35.0 FREQUENCY OF MICTURITION: ICD-10-CM

## 2024-02-24 DIAGNOSIS — R10.12 LEFT UPPER QUADRANT PAIN: ICD-10-CM

## 2024-02-24 DIAGNOSIS — Z98.890 OTHER SPECIFIED POSTPROCEDURAL STATES: Chronic | ICD-10-CM

## 2024-02-24 DIAGNOSIS — R10.9 UNSPECIFIED ABDOMINAL PAIN: ICD-10-CM

## 2024-02-24 DIAGNOSIS — R10.32 LEFT LOWER QUADRANT PAIN: ICD-10-CM

## 2024-02-24 LAB
BASOPHILS # BLD AUTO: 0.06 K/UL — SIGNIFICANT CHANGE UP (ref 0–0.2)
BASOPHILS NFR BLD AUTO: 0.7 % — SIGNIFICANT CHANGE UP (ref 0–1)
EOSINOPHIL # BLD AUTO: 0.22 K/UL — SIGNIFICANT CHANGE UP (ref 0–0.7)
EOSINOPHIL NFR BLD AUTO: 2.6 % — SIGNIFICANT CHANGE UP (ref 0–8)
HCT VFR BLD CALC: 41.5 % — SIGNIFICANT CHANGE UP (ref 37–47)
HGB BLD-MCNC: 13.5 G/DL — SIGNIFICANT CHANGE UP (ref 12–16)
IMM GRANULOCYTES NFR BLD AUTO: 0.4 % — HIGH (ref 0.1–0.3)
LYMPHOCYTES # BLD AUTO: 2.09 K/UL — SIGNIFICANT CHANGE UP (ref 1.2–3.4)
LYMPHOCYTES # BLD AUTO: 25.1 % — SIGNIFICANT CHANGE UP (ref 20.5–51.1)
MCHC RBC-ENTMCNC: 26.1 PG — LOW (ref 27–31)
MCHC RBC-ENTMCNC: 32.5 G/DL — SIGNIFICANT CHANGE UP (ref 32–37)
MCV RBC AUTO: 80.1 FL — LOW (ref 81–99)
MONOCYTES # BLD AUTO: 0.66 K/UL — HIGH (ref 0.1–0.6)
MONOCYTES NFR BLD AUTO: 7.9 % — SIGNIFICANT CHANGE UP (ref 1.7–9.3)
NEUTROPHILS # BLD AUTO: 5.26 K/UL — SIGNIFICANT CHANGE UP (ref 1.4–6.5)
NEUTROPHILS NFR BLD AUTO: 63.3 % — SIGNIFICANT CHANGE UP (ref 42.2–75.2)
NRBC # BLD: 0 /100 WBCS — SIGNIFICANT CHANGE UP (ref 0–0)
PLATELET # BLD AUTO: 286 K/UL — SIGNIFICANT CHANGE UP (ref 130–400)
PMV BLD: 11.5 FL — HIGH (ref 7.4–10.4)
RBC # BLD: 5.18 M/UL — SIGNIFICANT CHANGE UP (ref 4.2–5.4)
RBC # FLD: 13.2 % — SIGNIFICANT CHANGE UP (ref 11.5–14.5)
WBC # BLD: 8.32 K/UL — SIGNIFICANT CHANGE UP (ref 4.8–10.8)
WBC # FLD AUTO: 8.32 K/UL — SIGNIFICANT CHANGE UP (ref 4.8–10.8)

## 2024-02-24 PROCEDURE — 96374 THER/PROPH/DIAG INJ IV PUSH: CPT | Mod: XU

## 2024-02-24 PROCEDURE — 74177 CT ABD & PELVIS W/CONTRAST: CPT | Mod: MC

## 2024-02-24 PROCEDURE — 81003 URINALYSIS AUTO W/O SCOPE: CPT

## 2024-02-24 PROCEDURE — 87086 URINE CULTURE/COLONY COUNT: CPT

## 2024-02-24 PROCEDURE — 84703 CHORIONIC GONADOTROPIN ASSAY: CPT

## 2024-02-24 PROCEDURE — 83690 ASSAY OF LIPASE: CPT

## 2024-02-24 PROCEDURE — 36415 COLL VENOUS BLD VENIPUNCTURE: CPT

## 2024-02-24 PROCEDURE — 99284 EMERGENCY DEPT VISIT MOD MDM: CPT | Mod: 25

## 2024-02-24 PROCEDURE — 80053 COMPREHEN METABOLIC PANEL: CPT

## 2024-02-24 PROCEDURE — 85025 COMPLETE CBC W/AUTO DIFF WBC: CPT

## 2024-02-24 PROCEDURE — 99285 EMERGENCY DEPT VISIT HI MDM: CPT

## 2024-02-24 RX ORDER — METOCLOPRAMIDE HCL 10 MG
10 TABLET ORAL ONCE
Refills: 0 | Status: COMPLETED | OUTPATIENT
Start: 2024-02-24 | End: 2024-02-24

## 2024-02-24 RX ORDER — SODIUM CHLORIDE 9 MG/ML
1000 INJECTION INTRAMUSCULAR; INTRAVENOUS; SUBCUTANEOUS ONCE
Refills: 0 | Status: COMPLETED | OUTPATIENT
Start: 2024-02-24 | End: 2024-02-24

## 2024-02-24 RX ORDER — ACETAMINOPHEN 500 MG
975 TABLET ORAL ONCE
Refills: 0 | Status: COMPLETED | OUTPATIENT
Start: 2024-02-24 | End: 2024-02-24

## 2024-02-24 RX ADMIN — Medication 975 MILLIGRAM(S): at 23:57

## 2024-02-25 LAB
ALBUMIN SERPL ELPH-MCNC: 4.1 G/DL — SIGNIFICANT CHANGE UP (ref 3.5–5.2)
ALP SERPL-CCNC: 104 U/L — SIGNIFICANT CHANGE UP (ref 30–115)
ALT FLD-CCNC: 22 U/L — SIGNIFICANT CHANGE UP (ref 0–41)
ANION GAP SERPL CALC-SCNC: 9 MMOL/L — SIGNIFICANT CHANGE UP (ref 7–14)
APPEARANCE UR: CLEAR — SIGNIFICANT CHANGE UP
AST SERPL-CCNC: 26 U/L — SIGNIFICANT CHANGE UP (ref 0–41)
BILIRUB SERPL-MCNC: 0.3 MG/DL — SIGNIFICANT CHANGE UP (ref 0.2–1.2)
BILIRUB UR-MCNC: NEGATIVE — SIGNIFICANT CHANGE UP
BUN SERPL-MCNC: 8 MG/DL — LOW (ref 10–20)
CALCIUM SERPL-MCNC: 9.7 MG/DL — SIGNIFICANT CHANGE UP (ref 8.4–10.5)
CHLORIDE SERPL-SCNC: 99 MMOL/L — SIGNIFICANT CHANGE UP (ref 98–110)
CO2 SERPL-SCNC: 26 MMOL/L — SIGNIFICANT CHANGE UP (ref 17–32)
COLOR SPEC: YELLOW — SIGNIFICANT CHANGE UP
CREAT SERPL-MCNC: 1 MG/DL — SIGNIFICANT CHANGE UP (ref 0.7–1.5)
DIFF PNL FLD: NEGATIVE — SIGNIFICANT CHANGE UP
EGFR: 75 ML/MIN/1.73M2 — SIGNIFICANT CHANGE UP
GLUCOSE SERPL-MCNC: 113 MG/DL — HIGH (ref 70–99)
GLUCOSE UR QL: NEGATIVE MG/DL — SIGNIFICANT CHANGE UP
HCG SERPL QL: NEGATIVE — SIGNIFICANT CHANGE UP
KETONES UR-MCNC: NEGATIVE MG/DL — SIGNIFICANT CHANGE UP
LEUKOCYTE ESTERASE UR-ACNC: NEGATIVE — SIGNIFICANT CHANGE UP
LIDOCAIN IGE QN: 23 U/L — SIGNIFICANT CHANGE UP (ref 7–60)
NITRITE UR-MCNC: NEGATIVE — SIGNIFICANT CHANGE UP
PH UR: 6 — SIGNIFICANT CHANGE UP (ref 5–8)
POTASSIUM SERPL-MCNC: 4.2 MMOL/L — SIGNIFICANT CHANGE UP (ref 3.5–5)
POTASSIUM SERPL-SCNC: 4.2 MMOL/L — SIGNIFICANT CHANGE UP (ref 3.5–5)
PROT SERPL-MCNC: 6.6 G/DL — SIGNIFICANT CHANGE UP (ref 6–8)
PROT UR-MCNC: NEGATIVE MG/DL — SIGNIFICANT CHANGE UP
SODIUM SERPL-SCNC: 134 MMOL/L — LOW (ref 135–146)
SP GR SPEC: 1.01 — SIGNIFICANT CHANGE UP (ref 1–1.03)
UROBILINOGEN FLD QL: 0.2 MG/DL — SIGNIFICANT CHANGE UP (ref 0.2–1)

## 2024-02-25 PROCEDURE — 74177 CT ABD & PELVIS W/CONTRAST: CPT | Mod: 26,MC

## 2024-02-25 RX ADMIN — Medication 104 MILLIGRAM(S): at 00:38

## 2024-02-25 RX ADMIN — SODIUM CHLORIDE 1000 MILLILITER(S): 9 INJECTION INTRAMUSCULAR; INTRAVENOUS; SUBCUTANEOUS at 00:39

## 2024-02-25 NOTE — ED PROVIDER NOTE - CLINICAL SUMMARY MEDICAL DECISION MAKING FREE TEXT BOX
38-year-old female, history of anxiety, interstitial cystitis, presents with intermittent left flank pain and urinary frequency for a few days.  Given Bactrim for UTI.  Labs unremarkable.  Urinalysis negative.  CT abdomen no acute pathology.  Given IVF, Tylenol and Reglan with improvement.  Will DC and refer to PCP.

## 2024-02-25 NOTE — ED ADULT NURSE NOTE - NS ED PATIENT SAFETY CONCERN
Physical Therapy Daily Treatment Note      Patient: Ady Montague   : 1952  Referring practitioner: Yasmany Lugo *  Date of Initial Visit: Type: THERAPY  Noted: 2022  Today's Date: 8/3/2022  Patient seen for 10 sessions       Visit Diagnoses:    ICD-10-CM ICD-9-CM   1. Chronic left shoulder pain  M25.512 719.41    G89.29 338.29   2. Impaired mobility and activities of daily living  Z74.09 V49.89    Z78.9    3. Weakness of left arm  R29.898 729.89       Subjective   Shoulder feels better today after resting.    Objective   See Exercise, Manual, and Modality Logs for complete treatment.       Assessment/Plan  Improved tolerance to therapy w/ fewer c/o pain. Did not progress exercises today to avoid new aggravation. Discussed plan to progress functional exercise in near future as tolerated. Progress per POC.      Timed:         Manual Therapy:    10     mins  43877;     Therapeutic Exercise:    25     mins  34812;     Neuromuscular Ajith:    0    mins  60010;    Therapeutic Activity:     0     mins  63906;     Gait Trainin     mins  22332;     Ultrasound:     8     mins  16126;    Ionto                               0    mins   61947        Timed Treatment:   43   mins   Total Treatment:     43   mins    Carmel Hampton, PT  [place] License: [id number]   No

## 2024-02-25 NOTE — ED ADULT NURSE NOTE - NSFALLUNIVINTERV_ED_ALL_ED
Bed/Stretcher in lowest position, wheels locked, appropriate side rails in place/Call bell, personal items and telephone in reach/Instruct patient to call for assistance before getting out of bed/chair/stretcher/Non-slip footwear applied when patient is off stretcher/[place] to call system/Physically safe environment - no spills, clutter or unnecessary equipment/Purposeful proactive rounding/Room/bathroom lighting operational, light cord in reach

## 2024-02-25 NOTE — ED PROVIDER NOTE - PHYSICAL EXAMINATION
Quality 110: Preventive Care And Screening: Influenza Immunization: Influenza Immunization Administered during Influenza season Quality 226: Preventive Care And Screening: Tobacco Use: Screening And Cessation Intervention: Patient screened for tobacco use and is an ex/non-smoker Quality 111:Pneumonia Vaccination Status For Older Adults: Pneumococcal Vaccination Previously Received Detail Level: Detailed Quality 130: Documentation Of Current Medications In The Medical Record: Current Medications Documented As Follows:  CONST: Well appearing in NAD  EYES: PERRL, EOMI, Sclera and conjunctiva clear.   CARD: No murmurs, rubs, or gallops; Normal rate and rhythm  RESP: BS Equal B/L, No wheezes, rhonchi or rales. No distress or accessory breathing  GI: Soft, non-tender, non-distended. Left cva tenderness.   SKIN: Warm, dry, no acute rashes. MMM  NEURO: A&Ox3, No focal deficits. Strength and sensation intact. Steady gait

## 2024-02-25 NOTE — ED PROVIDER NOTE - ATTENDING APP SHARED VISIT CONTRIBUTION OF CARE
38-year-old female, history of anxiety, interstitial cystitis, presents with intermittent left flank pain and urinary frequency for few days.  Was given Bactrim for UTI.  Exam shows alert patient in no distress, HEENT NCAT PERRL, neck supple, lungs clear, RR S1S2, abdomen soft NT +BS, no CCE, left paravertebral tenderness, neuro no deficits.

## 2024-02-25 NOTE — ED PROVIDER NOTE - OBJECTIVE STATEMENT
Patient is a 38-year-old female with past medical history of anxiety, interstitial cystitis on Ozempic for weight loss presents for evaluation of left-sided flank pain that is been worsening throughout the day with associated nausea without vomiting.  She is on the Bactrim being treated for UTI over the past 5 days with an additional 5 days of doses remaining.  She denies any fever, cough, sore throat, chills, chest pain, shortness of breath, current urinary complaints.

## 2024-02-25 NOTE — ED PROVIDER NOTE - PATIENT PORTAL LINK FT
You can access the FollowMyHealth Patient Portal offered by [facility] by registering at the following website: [email or web address]. By joining [employer]’s FollowMyHealth portal, you will also be able to view your health information using other applications (apps) compatible with our system.

## 2024-02-26 ENCOUNTER — APPOINTMENT (OUTPATIENT)
Dept: CARDIOLOGY | Facility: CLINIC | Age: 39
End: 2024-02-26
Payer: MEDICAID

## 2024-02-26 DIAGNOSIS — R07.9 CHEST PAIN, UNSPECIFIED: ICD-10-CM

## 2024-02-26 LAB
CULTURE RESULTS: NO GROWTH — SIGNIFICANT CHANGE UP
SPECIMEN SOURCE: SIGNIFICANT CHANGE UP

## 2024-02-26 PROCEDURE — 93320 DOPPLER ECHO COMPLETE: CPT

## 2024-02-26 PROCEDURE — 93325 DOPPLER ECHO COLOR FLOW MAPG: CPT

## 2024-02-26 PROCEDURE — 93351 STRESS TTE COMPLETE: CPT

## 2024-02-28 PROBLEM — R07.9 CHEST PAIN, UNSPECIFIED TYPE: Status: ACTIVE | Noted: 2022-09-13

## 2024-03-21 RX ORDER — GALCANEZUMAB 120 MG/ML
120 INJECTION, SOLUTION SUBCUTANEOUS
Qty: 2 | Refills: 3 | Status: ACTIVE | COMMUNITY
Start: 2021-06-14

## 2024-05-29 ENCOUNTER — NON-APPOINTMENT (OUTPATIENT)
Age: 39
End: 2024-05-29

## 2024-05-30 ENCOUNTER — APPOINTMENT (OUTPATIENT)
Dept: NEUROLOGY | Facility: CLINIC | Age: 39
End: 2024-05-30
Payer: MEDICAID

## 2024-05-30 VITALS — WEIGHT: 257 LBS | HEIGHT: 67 IN | BODY MASS INDEX: 40.34 KG/M2

## 2024-05-30 VITALS — HEART RATE: 81 BPM | SYSTOLIC BLOOD PRESSURE: 113 MMHG | DIASTOLIC BLOOD PRESSURE: 80 MMHG

## 2024-05-30 DIAGNOSIS — F41.9 ANXIETY DISORDER, UNSPECIFIED: ICD-10-CM

## 2024-05-30 DIAGNOSIS — M54.2 CERVICALGIA: ICD-10-CM

## 2024-05-30 DIAGNOSIS — M54.16 RADICULOPATHY, LUMBAR REGION: ICD-10-CM

## 2024-05-30 DIAGNOSIS — F98.8 OTHER SPECIFIED BEHAVIORAL AND EMOTIONAL DISORDERS WITH ONSET USUALLY OCCURRING IN CHILDHOOD AND ADOLESCENCE: ICD-10-CM

## 2024-05-30 PROCEDURE — 99214 OFFICE O/P EST MOD 30 MIN: CPT

## 2024-05-30 PROCEDURE — G2211 COMPLEX E/M VISIT ADD ON: CPT | Mod: NC,1L

## 2024-05-30 RX ORDER — HYDROXYZINE HYDROCHLORIDE 50 MG/1
50 TABLET ORAL
Qty: 120 | Refills: 1 | Status: DISCONTINUED | COMMUNITY
Start: 2020-07-22 | End: 2024-05-30

## 2024-05-30 RX ORDER — TIRZEPATIDE 2.5 MG/.5ML
2.5 INJECTION, SOLUTION SUBCUTANEOUS
Qty: 8 | Refills: 1 | Status: DISCONTINUED | COMMUNITY
Start: 2024-02-06 | End: 2024-05-30

## 2024-05-31 PROBLEM — M54.2 CERVICALGIA: Status: ACTIVE | Noted: 2021-10-13

## 2024-05-31 PROBLEM — F41.9 ANXIETY: Status: ACTIVE | Noted: 2020-02-25

## 2024-05-31 PROBLEM — F98.8 ADD (ATTENTION DEFICIT DISORDER): Status: ACTIVE | Noted: 2020-07-22

## 2024-05-31 PROBLEM — M54.16 LUMBAR RADICULOPATHY: Status: ACTIVE | Noted: 2020-05-11

## 2024-05-31 NOTE — HISTORY OF PRESENT ILLNESS
[FreeTextEntry1] : Since last visit she continues to have back pain and her migraines have increased significantly.  She is looking for a new neurologist to do Botox.  Still c/o memory issues but this is likely related to her attention issues as I mentioned previously to her  From last note: Ms. Deshpande is a 39yo woman being seen for follow up of her lower back pain, neck pain and migraines. She has done Botox last injection 12/15/2023.  She says that the migraines still worsen towards the end of the month.  She is having issues focusing with her eyes.  She was doing better with dizziness but recently it has started bothering her again.  She did have an MRI joey and IAC which was normal She believes her memory is continuously getting worse  The emgality has helped significantly with migraines and they have been reduced to almost none to 1 per month.

## 2024-05-31 NOTE — DISCUSSION/SUMMARY
[FreeTextEntry1] : Ms. Deshpande is a 37yo woman being seen for follow up of her lower back pain, neck pain and migraines.  She has had increasing lower back pain over the last few months and her neck pain has improved.  Her migraines are somewhat stable with emaglity and eletriptan with botox.  She conitnues to have memory issues and intermittent vertigo  1. Neuropsych testing 2. Continue Botox  3. Continue triptans, emgality 4. Recommended yoga 5. f/u in 6 months

## 2024-08-01 ENCOUNTER — APPOINTMENT (OUTPATIENT)
Dept: NEUROPSYCHOLOGY | Facility: CLINIC | Age: 39
End: 2024-08-01

## 2024-08-01 ENCOUNTER — OUTPATIENT (OUTPATIENT)
Dept: OUTPATIENT SERVICES | Facility: HOSPITAL | Age: 39
LOS: 1 days | End: 2024-08-01
Payer: MEDICAID

## 2024-08-01 DIAGNOSIS — G31.84 MILD COGNITIVE IMPAIRMENT OF UNCERTAIN OR UNKNOWN ETIOLOGY: ICD-10-CM

## 2024-08-01 DIAGNOSIS — Z98.890 OTHER SPECIFIED POSTPROCEDURAL STATES: Chronic | ICD-10-CM

## 2024-08-01 PROCEDURE — 96121 NUBHVL XM PHY/QHP EA ADDL HR: CPT

## 2024-08-01 PROCEDURE — 96116 NUBHVL XM PHYS/QHP 1ST HR: CPT

## 2024-08-02 ENCOUNTER — NON-APPOINTMENT (OUTPATIENT)
Age: 39
End: 2024-08-02

## 2024-08-02 ENCOUNTER — APPOINTMENT (OUTPATIENT)
Dept: CARDIOLOGY | Facility: CLINIC | Age: 39
End: 2024-08-02
Payer: MEDICAID

## 2024-08-02 VITALS
BODY MASS INDEX: 40.81 KG/M2 | WEIGHT: 260 LBS | HEIGHT: 67 IN | DIASTOLIC BLOOD PRESSURE: 76 MMHG | SYSTOLIC BLOOD PRESSURE: 112 MMHG | HEART RATE: 95 BPM

## 2024-08-02 DIAGNOSIS — E66.01 MORBID (SEVERE) OBESITY DUE TO EXCESS CALORIES: ICD-10-CM

## 2024-08-02 DIAGNOSIS — Z00.00 ENCOUNTER FOR GENERAL ADULT MEDICAL EXAMINATION W/OUT ABNORMAL FINDINGS: ICD-10-CM

## 2024-08-02 DIAGNOSIS — Q79.60 EHLERS-DANLOS SYNDROME, UNSP: ICD-10-CM

## 2024-08-02 DIAGNOSIS — G31.84 MILD COGNITIVE IMPAIRMENT OF UNCERTAIN OR UNKNOWN ETIOLOGY: ICD-10-CM

## 2024-08-02 PROCEDURE — 93000 ELECTROCARDIOGRAM COMPLETE: CPT

## 2024-08-02 PROCEDURE — 99214 OFFICE O/P EST MOD 30 MIN: CPT | Mod: 25

## 2024-08-02 NOTE — ASSESSMENT
[FreeTextEntry1] : Ms. 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  Impression: (1) Palpitations, grossly unchanged from prior evaluation, minimally symptomatic (2) Chart diagnosis Tommy-Danlos syndrome (3) Dyspnea on exertion, normal stress echo 2/2024 at a moderate workload  Plan: - Stress echo results discussed, no evidence of ischemia - MRA to assess for TAA pending - RTC 6 months with labs

## 2024-08-02 NOTE — HISTORY OF PRESENT ILLNESS
[FreeTextEntry1] : Ms. Deshpande is a 39-year-old woman with history of presumed Ehler-Danlos syndrome presenting for follow up.  Patient previously followed with Dr. Gonzalez and was last seen in office 9/2022. Was recently admitted for evaluation of abdominal pain with liver test abnormalities. Extensive workup including MRCP and HIDA scan were unremarkable. She recalls being told she could be considered for an ERCP but was advised against this. Also recalls being told she may have sphincter of Oddi syndrome.  Today notes several complaints, including palpitations, exertional dyspnea, and concern for thoracic aortic aneurysm in light of her history of EDS.  TTE 7/2022 - Normal systolic function, no significant valvular disease Stress ECho 2/2024: 9 mins, 92% MPHR, 10.3 METs, negative for ischemia, baseline study unremarkable 14 Day Event Monitor 2021 - Grossly normal study, no arrhythmia AAA US 2021 - No AAA  Labs: - , TG 65, HDL 67, LDL 86 -> 111 - Cr 0.7, K 4.1, , , AlkP 117 - A1c 5.5%, hsCRP 4.7, pBNP <36, TSH 1.1

## 2024-08-11 ENCOUNTER — EMERGENCY (EMERGENCY)
Facility: HOSPITAL | Age: 39
LOS: 0 days | Discharge: HOME | End: 2024-08-11
Attending: EMERGENCY MEDICINE
Payer: MEDICAID

## 2024-08-11 VITALS
HEIGHT: 67 IN | WEIGHT: 259.93 LBS | OXYGEN SATURATION: 97 % | TEMPERATURE: 98 F | DIASTOLIC BLOOD PRESSURE: 80 MMHG | SYSTOLIC BLOOD PRESSURE: 123 MMHG | HEART RATE: 85 BPM | RESPIRATION RATE: 20 BRPM

## 2024-08-11 VITALS
RESPIRATION RATE: 18 BRPM | DIASTOLIC BLOOD PRESSURE: 67 MMHG | HEART RATE: 82 BPM | SYSTOLIC BLOOD PRESSURE: 104 MMHG | OXYGEN SATURATION: 99 %

## 2024-08-11 DIAGNOSIS — Z98.890 OTHER SPECIFIED POSTPROCEDURAL STATES: Chronic | ICD-10-CM

## 2024-08-11 DIAGNOSIS — Z20.822 CONTACT WITH AND (SUSPECTED) EXPOSURE TO COVID-19: ICD-10-CM

## 2024-08-11 DIAGNOSIS — J45.909 UNSPECIFIED ASTHMA, UNCOMPLICATED: ICD-10-CM

## 2024-08-11 DIAGNOSIS — B34.9 VIRAL INFECTION, UNSPECIFIED: ICD-10-CM

## 2024-08-11 DIAGNOSIS — R19.7 DIARRHEA, UNSPECIFIED: ICD-10-CM

## 2024-08-11 DIAGNOSIS — F32.A DEPRESSION, UNSPECIFIED: ICD-10-CM

## 2024-08-11 DIAGNOSIS — F41.9 ANXIETY DISORDER, UNSPECIFIED: ICD-10-CM

## 2024-08-11 LAB
ALBUMIN SERPL ELPH-MCNC: 4.1 G/DL — SIGNIFICANT CHANGE UP (ref 3.5–5.2)
ALP SERPL-CCNC: 112 U/L — SIGNIFICANT CHANGE UP (ref 30–115)
ALT FLD-CCNC: 21 U/L — SIGNIFICANT CHANGE UP (ref 0–41)
ANION GAP SERPL CALC-SCNC: 13 MMOL/L — SIGNIFICANT CHANGE UP (ref 7–14)
APPEARANCE UR: CLEAR — SIGNIFICANT CHANGE UP
AST SERPL-CCNC: 26 U/L — SIGNIFICANT CHANGE UP (ref 0–41)
BASOPHILS # BLD AUTO: 0.07 K/UL — SIGNIFICANT CHANGE UP (ref 0–0.2)
BASOPHILS NFR BLD AUTO: 0.6 % — SIGNIFICANT CHANGE UP (ref 0–1)
BILIRUB SERPL-MCNC: <0.2 MG/DL — SIGNIFICANT CHANGE UP (ref 0.2–1.2)
BILIRUB UR-MCNC: NEGATIVE — SIGNIFICANT CHANGE UP
BUN SERPL-MCNC: 12 MG/DL — SIGNIFICANT CHANGE UP (ref 10–20)
CALCIUM SERPL-MCNC: 8.8 MG/DL — SIGNIFICANT CHANGE UP (ref 8.4–10.5)
CHLORIDE SERPL-SCNC: 102 MMOL/L — SIGNIFICANT CHANGE UP (ref 98–110)
CO2 SERPL-SCNC: 24 MMOL/L — SIGNIFICANT CHANGE UP (ref 17–32)
COLOR SPEC: YELLOW — SIGNIFICANT CHANGE UP
CREAT SERPL-MCNC: 0.8 MG/DL — SIGNIFICANT CHANGE UP (ref 0.7–1.5)
DIFF PNL FLD: NEGATIVE — SIGNIFICANT CHANGE UP
EGFR: 97 ML/MIN/1.73M2 — SIGNIFICANT CHANGE UP
EOSINOPHIL # BLD AUTO: 0.1 K/UL — SIGNIFICANT CHANGE UP (ref 0–0.7)
EOSINOPHIL NFR BLD AUTO: 0.9 % — SIGNIFICANT CHANGE UP (ref 0–8)
FLUAV AG NPH QL: SIGNIFICANT CHANGE UP
FLUBV AG NPH QL: SIGNIFICANT CHANGE UP
GLUCOSE SERPL-MCNC: 118 MG/DL — HIGH (ref 70–99)
GLUCOSE UR QL: NEGATIVE MG/DL — SIGNIFICANT CHANGE UP
HCG SERPL QL: NEGATIVE — SIGNIFICANT CHANGE UP
HCT VFR BLD CALC: 36 % — LOW (ref 37–47)
HGB BLD-MCNC: 11.2 G/DL — LOW (ref 12–16)
IMM GRANULOCYTES NFR BLD AUTO: 0.4 % — HIGH (ref 0.1–0.3)
KETONES UR-MCNC: NEGATIVE MG/DL — SIGNIFICANT CHANGE UP
LEUKOCYTE ESTERASE UR-ACNC: NEGATIVE — SIGNIFICANT CHANGE UP
LYMPHOCYTES # BLD AUTO: 2.24 K/UL — SIGNIFICANT CHANGE UP (ref 1.2–3.4)
LYMPHOCYTES # BLD AUTO: 20.1 % — LOW (ref 20.5–51.1)
MCHC RBC-ENTMCNC: 24.1 PG — LOW (ref 27–31)
MCHC RBC-ENTMCNC: 31.1 G/DL — LOW (ref 32–37)
MCV RBC AUTO: 77.6 FL — LOW (ref 81–99)
MONOCYTES # BLD AUTO: 0.69 K/UL — HIGH (ref 0.1–0.6)
MONOCYTES NFR BLD AUTO: 6.2 % — SIGNIFICANT CHANGE UP (ref 1.7–9.3)
NEUTROPHILS # BLD AUTO: 7.99 K/UL — HIGH (ref 1.4–6.5)
NEUTROPHILS NFR BLD AUTO: 71.8 % — SIGNIFICANT CHANGE UP (ref 42.2–75.2)
NITRITE UR-MCNC: NEGATIVE — SIGNIFICANT CHANGE UP
NRBC # BLD: 0 /100 WBCS — SIGNIFICANT CHANGE UP (ref 0–0)
PH UR: 6 — SIGNIFICANT CHANGE UP (ref 5–8)
PLATELET # BLD AUTO: 302 K/UL — SIGNIFICANT CHANGE UP (ref 130–400)
PMV BLD: 11.8 FL — HIGH (ref 7.4–10.4)
POTASSIUM SERPL-MCNC: 4.2 MMOL/L — SIGNIFICANT CHANGE UP (ref 3.5–5)
POTASSIUM SERPL-SCNC: 4.2 MMOL/L — SIGNIFICANT CHANGE UP (ref 3.5–5)
PROT SERPL-MCNC: 6.4 G/DL — SIGNIFICANT CHANGE UP (ref 6–8)
PROT UR-MCNC: NEGATIVE MG/DL — SIGNIFICANT CHANGE UP
RBC # BLD: 4.64 M/UL — SIGNIFICANT CHANGE UP (ref 4.2–5.4)
RBC # FLD: 14.8 % — HIGH (ref 11.5–14.5)
RSV RNA NPH QL NAA+NON-PROBE: SIGNIFICANT CHANGE UP
SARS-COV-2 RNA SPEC QL NAA+PROBE: SIGNIFICANT CHANGE UP
SODIUM SERPL-SCNC: 139 MMOL/L — SIGNIFICANT CHANGE UP (ref 135–146)
SP GR SPEC: 1.01 — SIGNIFICANT CHANGE UP (ref 1–1.03)
UROBILINOGEN FLD QL: 0.2 MG/DL — SIGNIFICANT CHANGE UP (ref 0.2–1)
WBC # BLD: 11.13 K/UL — HIGH (ref 4.8–10.8)
WBC # FLD AUTO: 11.13 K/UL — HIGH (ref 4.8–10.8)

## 2024-08-11 PROCEDURE — 99284 EMERGENCY DEPT VISIT MOD MDM: CPT

## 2024-08-11 PROCEDURE — 36000 PLACE NEEDLE IN VEIN: CPT

## 2024-08-11 PROCEDURE — 84703 CHORIONIC GONADOTROPIN ASSAY: CPT

## 2024-08-11 PROCEDURE — 36415 COLL VENOUS BLD VENIPUNCTURE: CPT

## 2024-08-11 PROCEDURE — 0241U: CPT

## 2024-08-11 PROCEDURE — 99283 EMERGENCY DEPT VISIT LOW MDM: CPT | Mod: 25

## 2024-08-11 PROCEDURE — 93010 ELECTROCARDIOGRAM REPORT: CPT

## 2024-08-11 PROCEDURE — 85025 COMPLETE CBC W/AUTO DIFF WBC: CPT

## 2024-08-11 PROCEDURE — 81003 URINALYSIS AUTO W/O SCOPE: CPT

## 2024-08-11 PROCEDURE — 93005 ELECTROCARDIOGRAM TRACING: CPT

## 2024-08-11 PROCEDURE — 80053 COMPREHEN METABOLIC PANEL: CPT

## 2024-08-11 RX ORDER — DEXTROSE MONOHYDRATE, SODIUM CHLORIDE, SODIUM LACTATE, CALCIUM CHLORIDE, MAGNESIUM CHLORIDE 1.5; 538; 448; 18.4; 5.08 G/100ML; MG/100ML; MG/100ML; MG/100ML; MG/100ML
1000 SOLUTION INTRAPERITONEAL ONCE
Refills: 0 | Status: COMPLETED | OUTPATIENT
Start: 2024-08-11 | End: 2024-08-11

## 2024-08-11 RX ADMIN — DEXTROSE MONOHYDRATE, SODIUM CHLORIDE, SODIUM LACTATE, CALCIUM CHLORIDE, MAGNESIUM CHLORIDE 1000 MILLILITER(S): 1.5; 538; 448; 18.4; 5.08 SOLUTION INTRAPERITONEAL at 07:21

## 2024-08-11 NOTE — ED PROVIDER NOTE - OBJECTIVE STATEMENT
39-year-old female, history of asthma, anxiety/depression, woke up with watery diarrhea, chills and not feeling right today.  Was recently at the [facility] for chest pain, had a CTA which was negative.  No fever, cough or shortness of breath. 39-year-old female, history of asthma, anxiety/depression, woke up with watery diarrhea, chills and not feeling right today.  Was recently at the [facility] for chest pain, had a CTA which was negative.   Also reports that she was recently stung by a bee.  No fever, cough or shortness of breath.

## 2024-08-11 NOTE — ED PROVIDER NOTE - PATIENT PORTAL LINK FT
You can access the FollowMyHealth Patient Portal offered by [facility] by registering at the following website: [email or web address]. By joining [employer]’s FollowMyHealth portal, you will also be able to view your health information using other applications (apps) compatible with our system.

## 2024-08-11 NOTE — ED PROVIDER NOTE - PROGRESS NOTE DETAILS
Signed out to Dr. Lopez. Pt reports she is feeling hot and cold. Sweats. On Wednesday bit by bee. No significant reaction. Friday was having sex and became short of breath. hx of asthma. Also felt heaviness to chest. Seen at hospital in [place]. Work up neg. Trops neg. CT ruled out PE. Released yesterday. This am one episode of diarrhea. NO feeling well. Pt states she has had abnormal vaginal bleed due to IUD misplacement and used day after pill Friday. Hx of reflux, anxiety and takes pantoprazole and psych meds. No change in dose. This am feels nausea. Also notes she feels dizzy, lightheaded. At home systolic bp 115. On exam no distress, S1s2 rrr, no murmur, lungs clear. Reviewed lab from today. Pt reports she is feeling hot and cold. Sweats. On Wednesday bit by bee. No significant reaction. Friday was having sex and became short of breath. hx of asthma. Also felt heaviness to chest. Seen at hospital in [place]. Work up neg. Trops neg. CT ruled out PE. Released yesterday. This am one episode of diarrhea. NO feeling well. Pt states she has had abnormal vaginal bleed due to IUD misplacement and used day after pill Friday. Hx of reflux, anxiety and takes pantoprazole and psych meds. No change in dose. This am feels nausea. Also notes she feels dizzy, lightheaded. At home systolic bp 115. On exam no distress, S1s2 rrr, no murmur, lungs clear. Reviewed lab from today. Hx of vertigo.

## 2024-08-11 NOTE — ED ADULT NURSE NOTE - PRO INTERPRETER NEED 2
Problem: Patient Care Overview  Goal: Plan of Care/Patient Progress Review  PT -   Discharge Planner PT   Patient plan for discharge: Not discussed  Current status: mod A x 2 with rolling for placement of sling for lift, mod A x 2 for LE support with  lift transfer to recliner, Instructed pt in seated LE and UE AROM exercises in recliner.   Barriers to return to prior living situation: NWB restrictions for R LE, impaired LE and UE strength, requiring assistance with bed mobility  Recommendations for discharge: TCU  Rationale for recommendations: Pt would benefit from skilled PT intervention in TCU to work on LE and UE strengthening, bed mobility, and transfers within precautions.   Entered by: Rene Cha 10/31/2018 2:37 PM            English

## 2024-08-11 NOTE — ED ADULT TRIAGE NOTE - WEIGHT IN KG
Pain Clinic Assessment:
 
1. History of Osteoarthritis:
UNKNOWN
   History of Rheumatoid Arthritis:
NO
 
2. Height: 5 ft. 2 in. 157.5 cm.
   Weight: 246.0 lb.  oz. 111.585 kg.
   Patient's BMI: 45.0
 
3. Vital Signs:
   BP: 118/78 Pulse: 71 Resp: 16
   Temp:  02 Sat: 97 ECG Mon:
 
4. Pain Intensity: 8
 
5. Fall Risk:
   Dizziness: N  Needs help standing or walking: Y
   Fallen in the last 3 months: Y
   Fall risk comments:
 
 
6. Patient on Blood Thinner: Clopidogrel Bisulf(Plavix
 
7. History of Hypertension: Y
 
8. Opioid Therapy greater than 6 weeks:
   Opiate Contract Signed:
 
9. Risk Assessment Tool Provided:
 
10. Functional Assessment Tool: 66/70
 
11. Recreational Drug Use: Unknown Drug Type:
    Tobacco Use: Unknown if Ever Smoked Tobacco Type:
       Amount or Packs/day:  How Many Years:
    Alcohol Use: No  Frequency:  Quant: 117.9

## 2024-08-11 NOTE — ED ADULT NURSE NOTE - NSICDXPASTMEDICALHX_GEN_ALL_CORE_FT
PAST MEDICAL HISTORY:  ADD (attention deficit disorder)     Anxiety     Asthma     Degenerative joint disease of cervical and lumbar spine     Tommy-Danlos syndrome     GERD (gastroesophageal reflux disease)     History of hypoglycemia     Migraine headache     Obese     Presence of IUD     Radiculopathy of cervical spine

## 2024-08-11 NOTE — ED PROVIDER NOTE - CLINICAL SUMMARY MEDICAL DECISION MAKING FREE TEXT BOX
Pt presents with nausea, diarrhea x 1, lightheadedness. Pt presents with nausea, diarrhea x 1, lightheadedness vs vertigo. labs normal. EKG normal. Pt hydrated with one liter fluids. Not orthostatic.

## 2024-08-12 ENCOUNTER — APPOINTMENT (OUTPATIENT)
Dept: NEUROPSYCHOLOGY | Facility: CLINIC | Age: 39
End: 2024-08-12

## 2024-08-13 ENCOUNTER — APPOINTMENT (OUTPATIENT)
Dept: PULMONOLOGY | Facility: CLINIC | Age: 39
End: 2024-08-13
Payer: MEDICAID

## 2024-08-13 VITALS
HEART RATE: 81 BPM | DIASTOLIC BLOOD PRESSURE: 82 MMHG | OXYGEN SATURATION: 98 % | BODY MASS INDEX: 40.81 KG/M2 | HEIGHT: 67 IN | SYSTOLIC BLOOD PRESSURE: 140 MMHG | RESPIRATION RATE: 14 BRPM | WEIGHT: 260 LBS

## 2024-08-13 DIAGNOSIS — J45.909 UNSPECIFIED ASTHMA, UNCOMPLICATED: ICD-10-CM

## 2024-08-13 DIAGNOSIS — G47.33 OBSTRUCTIVE SLEEP APNEA (ADULT) (PEDIATRIC): ICD-10-CM

## 2024-08-13 PROBLEM — Z97.5 PRESENCE OF (INTRAUTERINE) CONTRACEPTIVE DEVICE: Chronic | Status: ACTIVE | Noted: 2024-08-11

## 2024-08-13 PROCEDURE — G2211 COMPLEX E/M VISIT ADD ON: CPT | Mod: NC,1L

## 2024-08-13 PROCEDURE — 99214 OFFICE O/P EST MOD 30 MIN: CPT

## 2024-08-13 RX ORDER — PREDNISONE 20 MG/1
20 TABLET ORAL
Qty: 15 | Refills: 0 | Status: ACTIVE | COMMUNITY
Start: 2024-08-13 | End: 1900-01-01

## 2024-08-13 RX ORDER — ALBUTEROL SULFATE 2.5 MG/3ML
(2.5 MG/3ML) SOLUTION RESPIRATORY (INHALATION) 4 TIMES DAILY
Qty: 1 | Refills: 3 | Status: ACTIVE | COMMUNITY
Start: 2024-08-13 | End: 1900-01-01

## 2024-08-13 NOTE — HISTORY OF PRESENT ILLNESS
[TextBox_4] : ASTHMA /BEE STING/ ED/ CHEST TIGHTNESS/ COUGH BETTER WITH PREDNISONE/ ALBUTEROL SP PFT DENIES YADIRA SYMPTOMS

## 2024-08-15 ENCOUNTER — APPOINTMENT (OUTPATIENT)
Dept: CARDIOLOGY | Facility: CLINIC | Age: 39
End: 2024-08-15
Payer: MEDICAID

## 2024-08-15 VITALS
DIASTOLIC BLOOD PRESSURE: 80 MMHG | SYSTOLIC BLOOD PRESSURE: 116 MMHG | HEART RATE: 78 BPM | WEIGHT: 260 LBS | BODY MASS INDEX: 40.81 KG/M2 | HEIGHT: 67 IN

## 2024-08-15 VITALS — HEIGHT: 67 IN | BODY MASS INDEX: 40.81 KG/M2 | WEIGHT: 260 LBS

## 2024-08-15 DIAGNOSIS — R06.09 OTHER FORMS OF DYSPNEA: ICD-10-CM

## 2024-08-15 DIAGNOSIS — R07.9 CHEST PAIN, UNSPECIFIED: ICD-10-CM

## 2024-08-15 DIAGNOSIS — R00.2 PALPITATIONS: ICD-10-CM

## 2024-08-15 DIAGNOSIS — I25.10 ATHEROSCLEROTIC HEART DISEASE OF NATIVE CORONARY ARTERY W/OUT ANGINA PECTORIS: ICD-10-CM

## 2024-08-15 DIAGNOSIS — R42 DIZZINESS AND GIDDINESS: ICD-10-CM

## 2024-08-15 PROCEDURE — 93000 ELECTROCARDIOGRAM COMPLETE: CPT

## 2024-08-15 PROCEDURE — 99204 OFFICE O/P NEW MOD 45 MIN: CPT | Mod: 25

## 2024-08-15 RX ORDER — QUETIAPINE 25 MG/1
25 TABLET, FILM COATED ORAL
Refills: 0 | Status: ACTIVE | COMMUNITY

## 2024-08-15 RX ORDER — LEVOCETIRIZINE DIHYDROCHLORIDE 5 MG/1
TABLET, FILM COATED ORAL
Refills: 0 | Status: ACTIVE | COMMUNITY

## 2024-08-15 NOTE — PHYSICAL EXAM
[de-identified] : well appearing, overweight [de-identified] : reg, nL s1/s2, no m/r/g [de-identified] : CTA [de-identified] : alert, normal affect, logicalconversation

## 2024-08-15 NOTE — DISCUSSION/SUMMARY
[FreeTextEntry1] : IMPRESSION: 1. Middle-age female with overall low cardiovascular risk profile.  Somewhat atypical, although exertional, symptoms.  2. Palpitations possibly extrasystoles.  PLAN: CCTA for definitive CAD evaluation. MCOT to define palpitations. [EKG obtained to assist in diagnosis and management of assessed problem(s)] : EKG obtained to assist in diagnosis and management of assessed problem(s)

## 2024-08-15 NOTE — REASON FOR VISIT
[FreeTextEntry1] : 39-year-old female presents for evaluation. Regularly follows with Dr. Boucher, but had difficulty reaching and asked to see me.  Chart reviewed.  Constellation of symptoms beginning with a bee sting last Wednesday associated with mild periorbital edema and asthma exacerbation.  Acute chest discomfort, dyspnea, diaphoresis, and lightheadedness during sexual intercourse on Friday.  Presented to hospital in [facility], ruled out MI and had negative PE protocol CTA.  Also had mild vaginal bleeding secondary to IUD misplacement (pending removal) and took emergency contraception pill Friday.  Presented to [facility] ER on Sunday with malaise, diarrhea, and lightheadedness.  Not orthostatic.  Discharged after unremarkable evaluation and 1L IV fluid.  Persistent vague exertional dyspnea, chest discomfort, diaphoresis, palpitations, and lightheadedness.  One episode of near syncope after coughing.  Distinct from her typical asthma symptoms.  No syncope.  No prior anaphylaxis.  No exercise.  No prior exertional symptoms.  Notably, Miri has longstanding anxiety and several chronic pain syndromes, but no acute stressors.  EKG (8/11/2024): NSR 84, normal Stress ECHO (2/2024): Normal chambers and biventricular function.  No significant valve disease.  9 minutes (92%).  Normal EKG and echo response. Holter (2/2024): No arrhythmias PFT (2/2024): Minimal obstructive lung disease. Abdominal ultrasound (2021): No AAA  Labs reviewed (4/2024): , HDL 58, triglycerides 104 A1c 5.5 CBC, CMP, BNP, TSH unremarkable   PMH/PSH: Asthma IBS (unremarkable MRCP and HIDA) GERD LUX Migraine Chronic back pain Anxiety Reported Tommy-Danlos (based on shoulder x-ray, no other clear manifestations)  SOCIAL: Nonsmoker RN (school nurse)  FAMILY: No premature CV disease

## 2024-08-19 ENCOUNTER — APPOINTMENT (OUTPATIENT)
Dept: NEUROPSYCHOLOGY | Facility: CLINIC | Age: 39
End: 2024-08-19

## 2024-08-20 ENCOUNTER — APPOINTMENT (OUTPATIENT)
Dept: OTOLARYNGOLOGY | Facility: CLINIC | Age: 39
End: 2024-08-20
Payer: MEDICAID

## 2024-08-20 DIAGNOSIS — G43.909 MIGRAINE, UNSPECIFIED, NOT INTRACTABLE, W/OUT STATUS MIGRAINOSUS: ICD-10-CM

## 2024-08-20 DIAGNOSIS — R42 DIZZINESS AND GIDDINESS: ICD-10-CM

## 2024-08-20 PROCEDURE — 92550 TYMPANOMETRY & REFLEX THRESH: CPT | Mod: 52

## 2024-08-20 PROCEDURE — 99213 OFFICE O/P EST LOW 20 MIN: CPT | Mod: 25

## 2024-08-20 PROCEDURE — 92557 COMPREHENSIVE HEARING TEST: CPT

## 2024-08-20 NOTE — PHYSICAL EXAM
[Normal] : mucosa is normal [Midline] : trachea located in midline position [] : Worth-Hallpike test is negative

## 2024-08-20 NOTE — HISTORY OF PRESENT ILLNESS
[FreeTextEntry1] : Patient returns today c/o dizziness, migraines, chest tightness.  She states she was in the ER for tightness in the chest and dizziness and she had an X-ray which came back normal. She went back to the ER two days ago for the same symptoms and a migraine as well. She was feeling numbness, trembling, and dizziness. If she stands up, she feels as if she will pass out. Had bee sting on Wednesday. No further complaints.  Will have symptoms of vestibula migraine when she walks around. Will have intermittent tinnitus of both ears. States that there are times she has trouble hearing. Left ear feels clogged. Otalgia of right ear intermittently.

## 2024-10-01 DIAGNOSIS — Z01.818 ENCOUNTER FOR OTHER PREPROCEDURAL EXAMINATION: ICD-10-CM

## 2024-10-01 RX ORDER — METOPROLOL TARTRATE 100 MG/1
100 TABLET ORAL
Qty: 2 | Refills: 0 | Status: ACTIVE | COMMUNITY
Start: 2024-10-01 | End: 1900-01-01

## 2024-10-02 ENCOUNTER — RESULT REVIEW (OUTPATIENT)
Age: 39
End: 2024-10-02

## 2024-10-18 ENCOUNTER — NON-APPOINTMENT (OUTPATIENT)
Age: 39
End: 2024-10-18

## 2024-11-01 ENCOUNTER — RESULT REVIEW (OUTPATIENT)
Age: 39
End: 2024-11-01

## 2024-11-01 ENCOUNTER — OUTPATIENT (OUTPATIENT)
Dept: OUTPATIENT SERVICES | Facility: HOSPITAL | Age: 39
LOS: 1 days | End: 2024-11-01
Payer: MEDICAID

## 2024-11-01 DIAGNOSIS — Z98.890 OTHER SPECIFIED POSTPROCEDURAL STATES: Chronic | ICD-10-CM

## 2024-11-01 DIAGNOSIS — R42 DIZZINESS AND GIDDINESS: ICD-10-CM

## 2024-11-01 PROCEDURE — 70544 MR ANGIOGRAPHY HEAD W/O DYE: CPT

## 2024-11-01 PROCEDURE — 70544 MR ANGIOGRAPHY HEAD W/O DYE: CPT | Mod: 26,76

## 2024-11-02 DIAGNOSIS — R42 DIZZINESS AND GIDDINESS: ICD-10-CM

## 2024-11-05 ENCOUNTER — APPOINTMENT (OUTPATIENT)
Dept: NEUROLOGY | Facility: CLINIC | Age: 39
End: 2024-11-05
Payer: MEDICAID

## 2024-11-05 VITALS — BODY MASS INDEX: 41.12 KG/M2 | WEIGHT: 262 LBS | HEIGHT: 67 IN

## 2024-11-05 VITALS — OXYGEN SATURATION: 98 % | HEART RATE: 54 BPM | SYSTOLIC BLOOD PRESSURE: 94 MMHG | DIASTOLIC BLOOD PRESSURE: 68 MMHG

## 2024-11-05 DIAGNOSIS — F98.8 OTHER SPECIFIED BEHAVIORAL AND EMOTIONAL DISORDERS WITH ONSET USUALLY OCCURRING IN CHILDHOOD AND ADOLESCENCE: ICD-10-CM

## 2024-11-05 DIAGNOSIS — G43.909 MIGRAINE, UNSPECIFIED, NOT INTRACTABLE, W/OUT STATUS MIGRAINOSUS: ICD-10-CM

## 2024-11-05 DIAGNOSIS — R42 DIZZINESS AND GIDDINESS: ICD-10-CM

## 2024-11-05 PROCEDURE — G2211 COMPLEX E/M VISIT ADD ON: CPT | Mod: NC

## 2024-11-05 PROCEDURE — 99215 OFFICE O/P EST HI 40 MIN: CPT

## 2024-11-05 RX ORDER — SEMAGLUTIDE 2.4 MG/.75ML
2.4 INJECTION, SOLUTION SUBCUTANEOUS
Refills: 0 | Status: ACTIVE | COMMUNITY

## 2024-11-07 RX ORDER — UBROGEPANT 100 MG/1
100 TABLET ORAL
Qty: 30 | Refills: 5 | Status: ACTIVE | COMMUNITY
Start: 2024-11-05 | End: 1900-01-01

## 2024-11-07 RX ORDER — ATOGEPANT 10 MG/1
10 TABLET ORAL
Qty: 30 | Refills: 3 | Status: ACTIVE | COMMUNITY
Start: 2024-11-05 | End: 1900-01-01

## 2024-11-22 RX ORDER — ONDANSETRON 4 MG/1
4 TABLET, ORALLY DISINTEGRATING ORAL TWICE DAILY
Qty: 14 | Refills: 3 | Status: ACTIVE | COMMUNITY
Start: 2024-11-22 | End: 1900-01-01

## 2024-12-02 ENCOUNTER — APPOINTMENT (OUTPATIENT)
Dept: NEUROLOGY | Facility: CLINIC | Age: 39
End: 2024-12-02
Payer: MEDICAID

## 2024-12-02 PROCEDURE — 64615 CHEMODENERV MUSC MIGRAINE: CPT

## 2024-12-02 RX ORDER — ONABOTULINUMTOXINA 100 [USP'U]/1
100 INJECTION, POWDER, LYOPHILIZED, FOR SOLUTION INTRADERMAL; INTRAMUSCULAR
Refills: 0 | Status: ACTIVE | COMMUNITY

## 2024-12-02 RX ADMIN — ONABOTULINUMTOXINA 0.78 UNIT: 200 INJECTION, POWDER, LYOPHILIZED, FOR SOLUTION INTRADERMAL; INTRAMUSCULAR at 00:00

## 2024-12-13 ENCOUNTER — NON-APPOINTMENT (OUTPATIENT)
Age: 39
End: 2024-12-13

## 2024-12-16 RX ORDER — ERENUMAB-AOOE 140 MG/ML
140 INJECTION, SOLUTION SUBCUTANEOUS
Qty: 3 | Refills: 3 | Status: ACTIVE | COMMUNITY
Start: 2024-12-13 | End: 1900-01-01

## 2024-12-16 RX ORDER — RIZATRIPTAN BENZOATE 10 MG/1
10 TABLET ORAL
Qty: 15 | Refills: 3 | Status: ACTIVE | COMMUNITY
Start: 2024-12-13 | End: 1900-01-01

## 2024-12-23 ENCOUNTER — OUTPATIENT (OUTPATIENT)
Dept: OUTPATIENT SERVICES | Facility: HOSPITAL | Age: 39
LOS: 1 days | End: 2024-12-23
Payer: MEDICAID

## 2024-12-23 DIAGNOSIS — Z98.890 OTHER SPECIFIED POSTPROCEDURAL STATES: Chronic | ICD-10-CM

## 2024-12-23 DIAGNOSIS — R42 DIZZINESS AND GIDDINESS: ICD-10-CM

## 2024-12-23 PROCEDURE — 97162 PT EVAL MOD COMPLEX 30 MIN: CPT | Mod: GP

## 2024-12-24 DIAGNOSIS — R42 DIZZINESS AND GIDDINESS: ICD-10-CM

## 2024-12-27 ENCOUNTER — OUTPATIENT (OUTPATIENT)
Dept: OUTPATIENT SERVICES | Facility: HOSPITAL | Age: 39
LOS: 1 days | End: 2024-12-27

## 2024-12-27 DIAGNOSIS — R42 DIZZINESS AND GIDDINESS: ICD-10-CM

## 2024-12-27 DIAGNOSIS — Z98.890 OTHER SPECIFIED POSTPROCEDURAL STATES: Chronic | ICD-10-CM

## 2024-12-31 ENCOUNTER — EMERGENCY (EMERGENCY)
Facility: HOSPITAL | Age: 39
LOS: 0 days | Discharge: HOME | End: 2024-12-31
Attending: EMERGENCY MEDICINE
Payer: MEDICAID

## 2024-12-31 VITALS
HEIGHT: 67 IN | SYSTOLIC BLOOD PRESSURE: 113 MMHG | HEART RATE: 94 BPM | DIASTOLIC BLOOD PRESSURE: 70 MMHG | RESPIRATION RATE: 18 BRPM | TEMPERATURE: 98 F | OXYGEN SATURATION: 98 % | WEIGHT: 250 LBS

## 2024-12-31 DIAGNOSIS — R42 DIZZINESS AND GIDDINESS: ICD-10-CM

## 2024-12-31 DIAGNOSIS — Z98.890 OTHER SPECIFIED POSTPROCEDURAL STATES: Chronic | ICD-10-CM

## 2024-12-31 DIAGNOSIS — G43.709 CHRONIC MIGRAINE WITHOUT AURA, NOT INTRACTABLE, WITHOUT STATUS MIGRAINOSUS: ICD-10-CM

## 2024-12-31 LAB
ALBUMIN SERPL ELPH-MCNC: 4.3 G/DL — SIGNIFICANT CHANGE UP (ref 3.5–5.2)
ALP SERPL-CCNC: 120 U/L — HIGH (ref 30–115)
ALT FLD-CCNC: 24 U/L — SIGNIFICANT CHANGE UP (ref 0–41)
ANION GAP SERPL CALC-SCNC: 8 MMOL/L — SIGNIFICANT CHANGE UP (ref 7–14)
AST SERPL-CCNC: 18 U/L — SIGNIFICANT CHANGE UP (ref 0–41)
BASOPHILS # BLD AUTO: 0.04 K/UL — SIGNIFICANT CHANGE UP (ref 0–0.2)
BASOPHILS NFR BLD AUTO: 0.8 % — SIGNIFICANT CHANGE UP (ref 0–1)
BILIRUB SERPL-MCNC: 0.2 MG/DL — SIGNIFICANT CHANGE UP (ref 0.2–1.2)
BUN SERPL-MCNC: 7 MG/DL — LOW (ref 10–20)
CALCIUM SERPL-MCNC: 9.2 MG/DL — SIGNIFICANT CHANGE UP (ref 8.4–10.5)
CHLORIDE SERPL-SCNC: 103 MMOL/L — SIGNIFICANT CHANGE UP (ref 98–110)
CO2 SERPL-SCNC: 27 MMOL/L — SIGNIFICANT CHANGE UP (ref 17–32)
CREAT SERPL-MCNC: 0.8 MG/DL — SIGNIFICANT CHANGE UP (ref 0.7–1.5)
EGFR: 97 ML/MIN/1.73M2 — SIGNIFICANT CHANGE UP
EOSINOPHIL # BLD AUTO: 0.13 K/UL — SIGNIFICANT CHANGE UP (ref 0–0.7)
EOSINOPHIL NFR BLD AUTO: 2.7 % — SIGNIFICANT CHANGE UP (ref 0–8)
GLUCOSE SERPL-MCNC: 86 MG/DL — SIGNIFICANT CHANGE UP (ref 70–99)
HCG SERPL QL: NEGATIVE — SIGNIFICANT CHANGE UP
HCT VFR BLD CALC: 36.3 % — LOW (ref 37–47)
HGB BLD-MCNC: 11 G/DL — LOW (ref 12–16)
IMM GRANULOCYTES NFR BLD AUTO: 0.2 % — SIGNIFICANT CHANGE UP (ref 0.1–0.3)
LYMPHOCYTES # BLD AUTO: 1.05 K/UL — LOW (ref 1.2–3.4)
LYMPHOCYTES # BLD AUTO: 22.1 % — SIGNIFICANT CHANGE UP (ref 20.5–51.1)
MCHC RBC-ENTMCNC: 21.7 PG — LOW (ref 27–31)
MCHC RBC-ENTMCNC: 30.3 G/DL — LOW (ref 32–37)
MCV RBC AUTO: 71.7 FL — LOW (ref 81–99)
MONOCYTES # BLD AUTO: 0.38 K/UL — SIGNIFICANT CHANGE UP (ref 0.1–0.6)
MONOCYTES NFR BLD AUTO: 8 % — SIGNIFICANT CHANGE UP (ref 1.7–9.3)
NEUTROPHILS # BLD AUTO: 3.14 K/UL — SIGNIFICANT CHANGE UP (ref 1.4–6.5)
NEUTROPHILS NFR BLD AUTO: 66.2 % — SIGNIFICANT CHANGE UP (ref 42.2–75.2)
NRBC # BLD: 0 /100 WBCS — SIGNIFICANT CHANGE UP (ref 0–0)
PLATELET # BLD AUTO: 303 K/UL — SIGNIFICANT CHANGE UP (ref 130–400)
PMV BLD: 10.7 FL — HIGH (ref 7.4–10.4)
POTASSIUM SERPL-MCNC: 4.2 MMOL/L — SIGNIFICANT CHANGE UP (ref 3.5–5)
POTASSIUM SERPL-SCNC: 4.2 MMOL/L — SIGNIFICANT CHANGE UP (ref 3.5–5)
PROT SERPL-MCNC: 6.7 G/DL — SIGNIFICANT CHANGE UP (ref 6–8)
RBC # BLD: 5.06 M/UL — SIGNIFICANT CHANGE UP (ref 4.2–5.4)
RBC # FLD: 16 % — HIGH (ref 11.5–14.5)
SODIUM SERPL-SCNC: 138 MMOL/L — SIGNIFICANT CHANGE UP (ref 135–146)
WBC # BLD: 4.75 K/UL — LOW (ref 4.8–10.8)
WBC # FLD AUTO: 4.75 K/UL — LOW (ref 4.8–10.8)

## 2024-12-31 PROCEDURE — 96374 THER/PROPH/DIAG INJ IV PUSH: CPT

## 2024-12-31 PROCEDURE — 99285 EMERGENCY DEPT VISIT HI MDM: CPT

## 2024-12-31 PROCEDURE — 36415 COLL VENOUS BLD VENIPUNCTURE: CPT

## 2024-12-31 PROCEDURE — 99284 EMERGENCY DEPT VISIT MOD MDM: CPT | Mod: 25

## 2024-12-31 PROCEDURE — 96375 TX/PRO/DX INJ NEW DRUG ADDON: CPT

## 2024-12-31 PROCEDURE — 85025 COMPLETE CBC W/AUTO DIFF WBC: CPT

## 2024-12-31 PROCEDURE — 80053 COMPREHEN METABOLIC PANEL: CPT

## 2024-12-31 PROCEDURE — 84703 CHORIONIC GONADOTROPIN ASSAY: CPT

## 2024-12-31 RX ORDER — 0.9 % SODIUM CHLORIDE 0.9 %
1000 INTRAVENOUS SOLUTION INTRAVENOUS ONCE
Refills: 0 | Status: COMPLETED | OUTPATIENT
Start: 2024-12-31 | End: 2024-12-31

## 2024-12-31 RX ORDER — METOCLOPRAMIDE HYDROCHLORIDE 10 MG/1
10 TABLET ORAL ONCE
Refills: 0 | Status: COMPLETED | OUTPATIENT
Start: 2024-12-31 | End: 2024-12-31

## 2024-12-31 RX ORDER — MECLIZINE HCL 12.5 MG
25 TABLET ORAL ONCE
Refills: 0 | Status: COMPLETED | OUTPATIENT
Start: 2024-12-31 | End: 2024-12-31

## 2024-12-31 RX ADMIN — METOCLOPRAMIDE HYDROCHLORIDE 104 MILLIGRAM(S): 10 TABLET ORAL at 13:20

## 2024-12-31 RX ADMIN — Medication 1000 MILLILITER(S): at 13:20

## 2024-12-31 RX ADMIN — Medication 25 MILLIGRAM(S): at 13:35

## 2024-12-31 RX ADMIN — Medication 150 GRAM(S): at 13:20

## 2024-12-31 NOTE — ED PROVIDER NOTE - CLINICAL SUMMARY MEDICAL DECISION MAKING FREE TEXT BOX
Good response to ED tx.  sx improved.  In my opinion, outpatient treatment and follow up are appropriate.

## 2024-12-31 NOTE — ED PROVIDER NOTE - PATIENT PORTAL LINK FT
You can access the FollowMyHealth Patient Portal offered by [facility] by registering at the following website: [email or web address]. By joining [employer]’s FollowMyHealth portal, you will also be able to view your health information using other applications (apps) compatible with our system.

## 2024-12-31 NOTE — ED PROVIDER NOTE - OBJECTIVE STATEMENT
Patient with h/o chronic migraines, dizziness, vertigo, Seen by ENT and neuro,   Had CTAs , MRI , MRV , all neg, felt sx related to her migraines, Sx last for days at times, This episode now 3 days minimal improvement with her meds, Called her neuro and told to go to ED

## 2024-12-31 NOTE — ED PROVIDER NOTE - ATTENDING APP SHARED VISIT CONTRIBUTION OF CARE
Patient with a history of vestibular migraines that manifests itself as dizziness reports migraine phenomena that has been lasting for the last 3 days.  She is followed by neurology for this condition and has had negative neuroimaging. Her longstanding triptan therapy has recently been altered. Vital signs noted.  Neck supple.  Neuro is intact.  Patient had a good response to ED treatment.  Follow-up with neurology advised.

## 2024-12-31 NOTE — ED PROVIDER NOTE - NSFOLLOWUPINSTRUCTIONS_ED_ALL_ED_FT
follow up with PT for vestibular therapy      Our Emergency Department Referral Coordinators will be reaching out to you in the next 24-48 hours from 9:00am to 5:00pm to schedule a follow up appointment. Please expect a phone call from the hospital in that time frame. If you do not receive a call or if you have any questions or concerns, you can reach them at   [phone].

## 2025-01-07 RX ORDER — VERAPAMIL HYDROCHLORIDE 40 MG/1
40 TABLET ORAL TWICE DAILY
Qty: 60 | Refills: 1 | Status: ACTIVE | COMMUNITY
Start: 2025-01-07 | End: 1900-01-01

## 2025-01-08 RX ORDER — RIMEGEPANT SULFATE 75 MG/75MG
75 TABLET, ORALLY DISINTEGRATING ORAL
Qty: 12 | Refills: 0 | Status: ACTIVE | COMMUNITY
Start: 2025-01-07 | End: 1900-01-01

## 2025-01-10 RX ORDER — METOCLOPRAMIDE 10 MG/1
10 TABLET ORAL EVERY 6 HOURS
Qty: 60 | Refills: 0 | Status: ACTIVE | COMMUNITY
Start: 2025-01-10 | End: 1900-01-01

## 2025-01-10 RX ORDER — PROCHLORPERAZINE MALEATE 10 MG/1
10 TABLET ORAL EVERY 6 HOURS
Qty: 60 | Refills: 1 | Status: ACTIVE | COMMUNITY
Start: 2025-01-10 | End: 1900-01-01

## 2025-01-10 RX ORDER — MECLIZINE HYDROCHLORIDE 25 MG/1
25 TABLET ORAL
Qty: 30 | Refills: 0 | Status: ACTIVE | COMMUNITY
Start: 2025-01-10 | End: 1900-01-01

## 2025-01-29 ENCOUNTER — APPOINTMENT (OUTPATIENT)
Dept: PULMONOLOGY | Facility: CLINIC | Age: 40
End: 2025-01-29
Payer: MEDICAID

## 2025-01-29 VITALS
OXYGEN SATURATION: 99 % | BODY MASS INDEX: 40.34 KG/M2 | RESPIRATION RATE: 15 BRPM | HEIGHT: 67 IN | WEIGHT: 257 LBS | DIASTOLIC BLOOD PRESSURE: 70 MMHG | SYSTOLIC BLOOD PRESSURE: 120 MMHG | HEART RATE: 99 BPM

## 2025-01-29 DIAGNOSIS — G47.33 OBSTRUCTIVE SLEEP APNEA (ADULT) (PEDIATRIC): ICD-10-CM

## 2025-01-29 DIAGNOSIS — J45.909 UNSPECIFIED ASTHMA, UNCOMPLICATED: ICD-10-CM

## 2025-01-29 PROCEDURE — 99213 OFFICE O/P EST LOW 20 MIN: CPT

## 2025-01-29 PROCEDURE — G2211 COMPLEX E/M VISIT ADD ON: CPT | Mod: NC

## 2025-02-06 NOTE — DISCUSSION/SUMMARY
[FreeTextEntry1] : Asthma we will start Symbicort as a maintenance she will use albuterol as needed patient reports felt better with dexamethasone prior I think for her the risk and the benefit and about the side effects including osteoporosis cataract hyperglycemia etc. Possible YADIRA denies symptoms weight loss  scheduled metoprolol  insulin  high flow

## 2025-02-14 ENCOUNTER — APPOINTMENT (OUTPATIENT)
Dept: CARDIOLOGY | Facility: CLINIC | Age: 40
End: 2025-02-14

## 2025-02-18 ENCOUNTER — RX RENEWAL (OUTPATIENT)
Age: 40
End: 2025-02-18

## 2025-02-19 ENCOUNTER — APPOINTMENT (OUTPATIENT)
Dept: SLEEP CENTER | Facility: HOSPITAL | Age: 40
End: 2025-02-19

## 2025-02-19 ENCOUNTER — RX RENEWAL (OUTPATIENT)
Age: 40
End: 2025-02-19

## 2025-02-28 ENCOUNTER — APPOINTMENT (OUTPATIENT)
Dept: CARDIOLOGY | Facility: CLINIC | Age: 40
End: 2025-02-28

## 2025-02-28 ENCOUNTER — NON-APPOINTMENT (OUTPATIENT)
Age: 40
End: 2025-02-28

## 2025-02-28 VITALS
HEIGHT: 67 IN | SYSTOLIC BLOOD PRESSURE: 110 MMHG | HEART RATE: 87 BPM | WEIGHT: 257 LBS | DIASTOLIC BLOOD PRESSURE: 68 MMHG | BODY MASS INDEX: 40.34 KG/M2

## 2025-02-28 DIAGNOSIS — R00.2 PALPITATIONS: ICD-10-CM

## 2025-02-28 PROCEDURE — 93000 ELECTROCARDIOGRAM COMPLETE: CPT

## 2025-02-28 PROCEDURE — 99214 OFFICE O/P EST MOD 30 MIN: CPT | Mod: 25

## 2025-03-01 ENCOUNTER — LABORATORY RESULT (OUTPATIENT)
Age: 40
End: 2025-03-01

## 2025-03-01 LAB
25(OH)D3 SERPL-MCNC: 32 NG/ML
ALBUMIN SERPL ELPH-MCNC: 4.3 G/DL
ALP BLD-CCNC: 121 U/L
ALT SERPL-CCNC: 25 U/L
ANION GAP SERPL CALC-SCNC: 8 MMOL/L
AST SERPL-CCNC: 19 U/L
BILIRUB SERPL-MCNC: 0.3 MG/DL
BUN SERPL-MCNC: 6 MG/DL
CALCIUM SERPL-MCNC: 9.8 MG/DL
CHLORIDE SERPL-SCNC: 101 MMOL/L
CHOLEST SERPL-MCNC: 187 MG/DL
CO2 SERPL-SCNC: 30 MMOL/L
CREAT SERPL-MCNC: 0.8 MG/DL
EGFR: 97 ML/MIN/1.73M2
ESTIMATED AVERAGE GLUCOSE: 94 MG/DL
GLUCOSE SERPL-MCNC: 85 MG/DL
HBA1C MFR BLD HPLC: 4.9 %
HDLC SERPL-MCNC: 67 MG/DL
LDLC SERPL CALC-MCNC: 105 MG/DL
NONHDLC SERPL-MCNC: 120 MG/DL
POTASSIUM SERPL-SCNC: 4.7 MMOL/L
PROT SERPL-MCNC: 6.9 G/DL
SODIUM SERPL-SCNC: 139 MMOL/L
TRIGL SERPL-MCNC: 85 MG/DL
TSH SERPL-ACNC: 1.48 UIU/ML

## 2025-03-02 LAB
BASOPHILS # BLD AUTO: 0.05 K/UL
BASOPHILS NFR BLD AUTO: 0.7 %
EOSINOPHIL # BLD AUTO: 0.11 K/UL
EOSINOPHIL NFR BLD AUTO: 1.5 %
HCT VFR BLD CALC: 35.7 %
HGB BLD-MCNC: 10.7 G/DL
IMM GRANULOCYTES NFR BLD AUTO: 0.3 %
LYMPHOCYTES # BLD AUTO: 1.25 K/UL
LYMPHOCYTES NFR BLD AUTO: 17.5 %
MAN DIFF?: NORMAL
MCHC RBC-ENTMCNC: 22 PG
MCHC RBC-ENTMCNC: 30 G/DL
MCV RBC AUTO: 73.3 FL
MONOCYTES # BLD AUTO: 0.55 K/UL
MONOCYTES NFR BLD AUTO: 7.7 %
NEUTROPHILS # BLD AUTO: 5.16 K/UL
NEUTROPHILS NFR BLD AUTO: 72.3 %
PLATELET # BLD AUTO: 366 K/UL
RBC # BLD: 4.87 M/UL
RBC # FLD: 17.3 %
WBC # FLD AUTO: 7.14 K/UL

## 2025-03-04 ENCOUNTER — APPOINTMENT (OUTPATIENT)
Dept: NEUROLOGY | Facility: CLINIC | Age: 40
End: 2025-03-04
Payer: MEDICAID

## 2025-03-04 DIAGNOSIS — G43.909 MIGRAINE, UNSPECIFIED, NOT INTRACTABLE, W/OUT STATUS MIGRAINOSUS: ICD-10-CM

## 2025-03-04 PROCEDURE — 99214 OFFICE O/P EST MOD 30 MIN: CPT | Mod: 25

## 2025-03-04 PROCEDURE — 64615 CHEMODENERV MUSC MIGRAINE: CPT

## 2025-03-04 RX ORDER — TIRZEPATIDE 10 MG/.5ML
10 INJECTION, SOLUTION SUBCUTANEOUS
Refills: 0 | Status: ACTIVE | COMMUNITY

## 2025-03-06 RX ORDER — FREMANEZUMAB-VFRM 225 MG/1.5ML
225 INJECTION SUBCUTANEOUS
Qty: 3 | Refills: 0 | Status: ACTIVE | COMMUNITY
Start: 2025-03-04 | End: 1900-01-01

## 2025-03-12 NOTE — ED ADULT NURSE NOTE - SUICIDE SCREENING QUESTION 1
pt reports living in private home with  daughter and 2 granddaughters. Reports plenty of assist at home wishes to return. Pt has 1 platform step to enter with no handrails, has a flight of stairs inside but reports she will be making her needs met on the first floor. Independent prior to admission with no use of DME. Owns RW, crutches and w/c.
No

## 2025-03-26 ENCOUNTER — RX RENEWAL (OUTPATIENT)
Age: 40
End: 2025-03-26

## 2025-04-10 ENCOUNTER — APPOINTMENT (OUTPATIENT)
Dept: CARDIOLOGY | Facility: CLINIC | Age: 40
End: 2025-04-10

## 2025-04-15 ENCOUNTER — APPOINTMENT (OUTPATIENT)
Dept: SLEEP CENTER | Facility: HOSPITAL | Age: 40
End: 2025-04-15

## 2025-04-23 ENCOUNTER — NON-APPOINTMENT (OUTPATIENT)
Age: 40
End: 2025-04-23

## 2025-04-23 ENCOUNTER — APPOINTMENT (OUTPATIENT)
Dept: NEUROLOGY | Facility: CLINIC | Age: 40
End: 2025-04-23
Payer: MEDICAID

## 2025-04-23 ENCOUNTER — RX RENEWAL (OUTPATIENT)
Age: 40
End: 2025-04-23

## 2025-04-23 VITALS
HEIGHT: 67 IN | BODY MASS INDEX: 39.24 KG/M2 | SYSTOLIC BLOOD PRESSURE: 133 MMHG | WEIGHT: 250 LBS | HEART RATE: 81 BPM | DIASTOLIC BLOOD PRESSURE: 80 MMHG

## 2025-04-23 DIAGNOSIS — G43.909 MIGRAINE, UNSPECIFIED, NOT INTRACTABLE, W/OUT STATUS MIGRAINOSUS: ICD-10-CM

## 2025-04-23 DIAGNOSIS — R42 DIZZINESS AND GIDDINESS: ICD-10-CM

## 2025-04-23 PROCEDURE — 99214 OFFICE O/P EST MOD 30 MIN: CPT

## 2025-04-23 PROCEDURE — G2211 COMPLEX E/M VISIT ADD ON: CPT | Mod: NC

## 2025-04-26 NOTE — ED PROVIDER NOTE - PRO INTERPRETER NEED 2
Problem: SLP  Goal: SLP Goal  Description: Goals:  1. Pt will participate in on-going assessment of swallowing function.    2.Pt will tolerate PO diet of pureed consistency with thin liquids without overt s/s of aspiration.      Outcome: Progressing   Diet advanced to pureed. ST will con't to follow.   English

## 2025-05-09 RX ORDER — DIAZEPAM 10 MG/1
10 TABLET ORAL
Qty: 10 | Refills: 0 | Status: ACTIVE | COMMUNITY
Start: 2025-05-09 | End: 1900-01-01

## 2025-05-27 ENCOUNTER — APPOINTMENT (OUTPATIENT)
Dept: PULMONOLOGY | Facility: CLINIC | Age: 40
End: 2025-05-27
Payer: MEDICAID

## 2025-05-27 VITALS
HEIGHT: 67 IN | SYSTOLIC BLOOD PRESSURE: 126 MMHG | DIASTOLIC BLOOD PRESSURE: 70 MMHG | RESPIRATION RATE: 14 BRPM | HEART RATE: 88 BPM | WEIGHT: 244 LBS | OXYGEN SATURATION: 99 % | BODY MASS INDEX: 38.3 KG/M2

## 2025-05-27 DIAGNOSIS — G47.33 OBSTRUCTIVE SLEEP APNEA (ADULT) (PEDIATRIC): ICD-10-CM

## 2025-05-27 DIAGNOSIS — J45.909 UNSPECIFIED ASTHMA, UNCOMPLICATED: ICD-10-CM

## 2025-05-27 PROCEDURE — 99213 OFFICE O/P EST LOW 20 MIN: CPT

## 2025-05-27 PROCEDURE — G2211 COMPLEX E/M VISIT ADD ON: CPT | Mod: NC

## 2025-06-18 ENCOUNTER — APPOINTMENT (OUTPATIENT)
Dept: NEUROLOGY | Facility: CLINIC | Age: 40
End: 2025-06-18
Payer: MEDICAID

## 2025-06-18 PROCEDURE — 64615 CHEMODENERV MUSC MIGRAINE: CPT

## 2025-06-27 ENCOUNTER — OUTPATIENT (OUTPATIENT)
Dept: OUTPATIENT SERVICES | Facility: HOSPITAL | Age: 40
LOS: 1 days | Discharge: HOME | End: 2025-06-27
Payer: MEDICAID

## 2025-06-27 ENCOUNTER — APPOINTMENT (OUTPATIENT)
Dept: SLEEP CENTER | Facility: HOSPITAL | Age: 40
End: 2025-06-27

## 2025-06-27 DIAGNOSIS — G47.33 OBSTRUCTIVE SLEEP APNEA (ADULT) (PEDIATRIC): ICD-10-CM

## 2025-06-27 DIAGNOSIS — Z98.890 OTHER SPECIFIED POSTPROCEDURAL STATES: Chronic | ICD-10-CM

## 2025-06-27 PROCEDURE — 95800 SLP STDY UNATTENDED: CPT | Mod: 26

## 2025-06-27 PROCEDURE — 95800 SLP STDY UNATTENDED: CPT

## 2025-07-01 DIAGNOSIS — G47.33 OBSTRUCTIVE SLEEP APNEA (ADULT) (PEDIATRIC): ICD-10-CM

## 2025-07-03 ENCOUNTER — RX RENEWAL (OUTPATIENT)
Age: 40
End: 2025-07-03

## 2025-07-09 ENCOUNTER — OUTPATIENT (OUTPATIENT)
Dept: OUTPATIENT SERVICES | Facility: HOSPITAL | Age: 40
LOS: 1 days | End: 2025-07-09
Payer: MEDICAID

## 2025-07-09 DIAGNOSIS — Z00.8 ENCOUNTER FOR OTHER GENERAL EXAMINATION: ICD-10-CM

## 2025-07-09 DIAGNOSIS — R42 DIZZINESS AND GIDDINESS: ICD-10-CM

## 2025-07-09 DIAGNOSIS — Z98.890 OTHER SPECIFIED POSTPROCEDURAL STATES: Chronic | ICD-10-CM

## 2025-07-09 PROCEDURE — 93886 INTRACRANIAL COMPLETE STUDY: CPT | Mod: 26

## 2025-07-09 PROCEDURE — 93886 INTRACRANIAL COMPLETE STUDY: CPT

## 2025-07-10 DIAGNOSIS — R42 DIZZINESS AND GIDDINESS: ICD-10-CM

## 2025-07-11 ENCOUNTER — OUTPATIENT (OUTPATIENT)
Dept: OUTPATIENT SERVICES | Facility: HOSPITAL | Age: 40
LOS: 1 days | End: 2025-07-11
Payer: MEDICAID

## 2025-07-11 DIAGNOSIS — Z00.8 ENCOUNTER FOR OTHER GENERAL EXAMINATION: ICD-10-CM

## 2025-07-11 DIAGNOSIS — R92.8 OTHER ABNORMAL AND INCONCLUSIVE FINDINGS ON DIAGNOSTIC IMAGING OF BREAST: ICD-10-CM

## 2025-07-11 DIAGNOSIS — Z98.890 OTHER SPECIFIED POSTPROCEDURAL STATES: Chronic | ICD-10-CM

## 2025-07-11 PROCEDURE — 77062 BREAST TOMOSYNTHESIS BI: CPT | Mod: 26

## 2025-07-11 PROCEDURE — G0279: CPT

## 2025-07-11 PROCEDURE — 77066 DX MAMMO INCL CAD BI: CPT

## 2025-07-11 PROCEDURE — 76641 ULTRASOUND BREAST COMPLETE: CPT | Mod: 50

## 2025-07-11 PROCEDURE — 77066 DX MAMMO INCL CAD BI: CPT | Mod: 26

## 2025-07-11 PROCEDURE — 76641 ULTRASOUND BREAST COMPLETE: CPT | Mod: 26,50

## 2025-07-12 DIAGNOSIS — R92.8 OTHER ABNORMAL AND INCONCLUSIVE FINDINGS ON DIAGNOSTIC IMAGING OF BREAST: ICD-10-CM

## 2025-07-30 ENCOUNTER — APPOINTMENT (OUTPATIENT)
Dept: PULMONOLOGY | Facility: CLINIC | Age: 40
End: 2025-07-30

## 2025-07-31 ENCOUNTER — APPOINTMENT (OUTPATIENT)
Dept: SLEEP CENTER | Facility: HOSPITAL | Age: 40
End: 2025-07-31

## 2025-07-31 ENCOUNTER — OUTPATIENT (OUTPATIENT)
Dept: OUTPATIENT SERVICES | Facility: HOSPITAL | Age: 40
LOS: 1 days | Discharge: HOME | End: 2025-07-31
Payer: MEDICAID

## 2025-07-31 DIAGNOSIS — G47.33 OBSTRUCTIVE SLEEP APNEA (ADULT) (PEDIATRIC): ICD-10-CM

## 2025-07-31 DIAGNOSIS — Z98.890 OTHER SPECIFIED POSTPROCEDURAL STATES: Chronic | ICD-10-CM

## 2025-07-31 PROCEDURE — 95800 SLP STDY UNATTENDED: CPT

## 2025-08-04 ENCOUNTER — TRANSCRIPTION ENCOUNTER (OUTPATIENT)
Age: 40
End: 2025-08-04

## 2025-08-04 ENCOUNTER — APPOINTMENT (OUTPATIENT)
Dept: PULMONOLOGY | Facility: CLINIC | Age: 40
End: 2025-08-04

## 2025-08-04 ENCOUNTER — NON-APPOINTMENT (OUTPATIENT)
Age: 40
End: 2025-08-04

## 2025-08-22 DIAGNOSIS — G47.33 OBSTRUCTIVE SLEEP APNEA (ADULT) (PEDIATRIC): ICD-10-CM

## 2025-08-25 ENCOUNTER — RX RENEWAL (OUTPATIENT)
Age: 40
End: 2025-08-25

## 2025-08-28 ENCOUNTER — APPOINTMENT (OUTPATIENT)
Dept: PULMONOLOGY | Facility: CLINIC | Age: 40
End: 2025-08-28
Payer: MEDICAID

## 2025-08-28 DIAGNOSIS — G47.33 OBSTRUCTIVE SLEEP APNEA (ADULT) (PEDIATRIC): ICD-10-CM

## 2025-08-28 PROCEDURE — G2211 COMPLEX E/M VISIT ADD ON: CPT | Mod: NC,95

## 2025-08-28 PROCEDURE — 99212 OFFICE O/P EST SF 10 MIN: CPT | Mod: 95

## 2025-09-09 ENCOUNTER — RX RENEWAL (OUTPATIENT)
Age: 40
End: 2025-09-09

## 2025-09-11 ENCOUNTER — RX RENEWAL (OUTPATIENT)
Age: 40
End: 2025-09-11